# Patient Record
Sex: MALE | Race: BLACK OR AFRICAN AMERICAN | Employment: OTHER | URBAN - METROPOLITAN AREA
[De-identification: names, ages, dates, MRNs, and addresses within clinical notes are randomized per-mention and may not be internally consistent; named-entity substitution may affect disease eponyms.]

---

## 2024-09-11 ENCOUNTER — HOSPITAL ENCOUNTER (INPATIENT)
Age: 64
LOS: 2 days | Discharge: NURSING, REHAB OR LONG-TERM CARE | DRG: 673 | End: 2024-09-14
Attending: EMERGENCY MEDICINE | Admitting: INTERNAL MEDICINE
Payer: MEDICARE

## 2024-09-11 DIAGNOSIS — N49.3 FOURNIER'S GANGRENE: ICD-10-CM

## 2024-09-11 DIAGNOSIS — R31.0 GROSS HEMATURIA: Primary | ICD-10-CM

## 2024-09-11 PROCEDURE — 99285 EMERGENCY DEPT VISIT HI MDM: CPT

## 2024-09-12 ENCOUNTER — ANESTHESIA (OUTPATIENT)
Dept: OPERATING ROOM | Age: 64
End: 2024-09-12
Payer: MEDICARE

## 2024-09-12 ENCOUNTER — APPOINTMENT (OUTPATIENT)
Dept: CT IMAGING | Age: 64
DRG: 673 | End: 2024-09-12
Payer: MEDICARE

## 2024-09-12 ENCOUNTER — ANESTHESIA EVENT (OUTPATIENT)
Dept: OPERATING ROOM | Age: 64
End: 2024-09-12
Payer: MEDICARE

## 2024-09-12 PROBLEM — R31.9 HEMATURIA: Status: ACTIVE | Noted: 2024-09-12

## 2024-09-12 LAB
ABO + RH BLD: NORMAL
ANION GAP SERPL CALCULATED.3IONS-SCNC: 8 MMOL/L (ref 3–16)
BASOPHILS # BLD: 0 K/UL (ref 0–0.2)
BASOPHILS # BLD: 0 K/UL (ref 0–0.2)
BASOPHILS NFR BLD: 0.3 %
BASOPHILS NFR BLD: 0.4 %
BLD GP AB SCN SERPL QL: NORMAL
BUN SERPL-MCNC: 23 MG/DL (ref 7–20)
CALCIUM SERPL-MCNC: 9.5 MG/DL (ref 8.3–10.6)
CHLORIDE SERPL-SCNC: 105 MMOL/L (ref 99–110)
CO2 SERPL-SCNC: 27 MMOL/L (ref 21–32)
CREAT SERPL-MCNC: <0.5 MG/DL (ref 0.8–1.3)
DEPRECATED RDW RBC AUTO: 13.4 % (ref 12.4–15.4)
DEPRECATED RDW RBC AUTO: 13.7 % (ref 12.4–15.4)
EOSINOPHIL # BLD: 0 K/UL (ref 0–0.6)
EOSINOPHIL # BLD: 0.1 K/UL (ref 0–0.6)
EOSINOPHIL NFR BLD: 0.3 %
EOSINOPHIL NFR BLD: 1.4 %
GFR SERPLBLD CREATININE-BSD FMLA CKD-EPI: >90 ML/MIN/{1.73_M2}
GLUCOSE BLD-MCNC: 110 MG/DL (ref 70–99)
GLUCOSE SERPL-MCNC: 106 MG/DL (ref 70–99)
HCT VFR BLD AUTO: 34.7 % (ref 40.5–52.5)
HCT VFR BLD AUTO: 35.8 % (ref 40.5–52.5)
HGB BLD-MCNC: 11.6 G/DL (ref 13.5–17.5)
HGB BLD-MCNC: 12 G/DL (ref 13.5–17.5)
INR PPP: 1 (ref 0.85–1.15)
LYMPHOCYTES # BLD: 0.9 K/UL (ref 1–5.1)
LYMPHOCYTES # BLD: 2.1 K/UL (ref 1–5.1)
LYMPHOCYTES NFR BLD: 10.8 %
LYMPHOCYTES NFR BLD: 40.9 %
MCH RBC QN AUTO: 30.5 PG (ref 26–34)
MCH RBC QN AUTO: 30.6 PG (ref 26–34)
MCHC RBC AUTO-ENTMCNC: 33.4 G/DL (ref 31–36)
MCHC RBC AUTO-ENTMCNC: 33.5 G/DL (ref 31–36)
MCV RBC AUTO: 91.3 FL (ref 80–100)
MCV RBC AUTO: 91.4 FL (ref 80–100)
MONOCYTES # BLD: 0.4 K/UL (ref 0–1.3)
MONOCYTES # BLD: 0.6 K/UL (ref 0–1.3)
MONOCYTES NFR BLD: 6.5 %
MONOCYTES NFR BLD: 7.2 %
NEUTROPHILS # BLD: 2.5 K/UL (ref 1.7–7.7)
NEUTROPHILS # BLD: 7.1 K/UL (ref 1.7–7.7)
NEUTROPHILS NFR BLD: 50.1 %
NEUTROPHILS NFR BLD: 82.1 %
PERFORMED ON: ABNORMAL
PLATELET # BLD AUTO: 163 K/UL (ref 135–450)
PLATELET # BLD AUTO: 173 K/UL (ref 135–450)
PMV BLD AUTO: 8.3 FL (ref 5–10.5)
PMV BLD AUTO: 8.4 FL (ref 5–10.5)
POTASSIUM SERPL-SCNC: 3.8 MMOL/L (ref 3.5–5.1)
PROTHROMBIN TIME: 13.4 SEC (ref 11.9–14.9)
RBC # BLD AUTO: 3.8 M/UL (ref 4.2–5.9)
RBC # BLD AUTO: 3.92 M/UL (ref 4.2–5.9)
SODIUM SERPL-SCNC: 140 MMOL/L (ref 136–145)
WBC # BLD AUTO: 5 K/UL (ref 4–11)
WBC # BLD AUTO: 8.6 K/UL (ref 4–11)

## 2024-09-12 PROCEDURE — 2580000003 HC RX 258

## 2024-09-12 PROCEDURE — 6370000000 HC RX 637 (ALT 250 FOR IP): Performed by: UROLOGY

## 2024-09-12 PROCEDURE — 87075 CULTR BACTERIA EXCEPT BLOOD: CPT

## 2024-09-12 PROCEDURE — 86850 RBC ANTIBODY SCREEN: CPT

## 2024-09-12 PROCEDURE — 74178 CT ABD&PLV WO CNTR FLWD CNTR: CPT

## 2024-09-12 PROCEDURE — 86900 BLOOD TYPING SEROLOGIC ABO: CPT

## 2024-09-12 PROCEDURE — 3700000001 HC ADD 15 MINUTES (ANESTHESIA): Performed by: UROLOGY

## 2024-09-12 PROCEDURE — 6360000002 HC RX W HCPCS

## 2024-09-12 PROCEDURE — 0WJM0ZZ INSPECTION OF MALE PERINEUM, OPEN APPROACH: ICD-10-PCS | Performed by: UROLOGY

## 2024-09-12 PROCEDURE — 87076 CULTURE ANAEROBE IDENT EACH: CPT

## 2024-09-12 PROCEDURE — 0JBB0ZZ EXCISION OF PERINEUM SUBCUTANEOUS TISSUE AND FASCIA, OPEN APPROACH: ICD-10-PCS | Performed by: UROLOGY

## 2024-09-12 PROCEDURE — 0TJB8ZZ INSPECTION OF BLADDER, VIA NATURAL OR ARTIFICIAL OPENING ENDOSCOPIC: ICD-10-PCS | Performed by: UROLOGY

## 2024-09-12 PROCEDURE — 87102 FUNGUS ISOLATION CULTURE: CPT

## 2024-09-12 PROCEDURE — 2580000003 HC RX 258: Performed by: UROLOGY

## 2024-09-12 PROCEDURE — 87205 SMEAR GRAM STAIN: CPT

## 2024-09-12 PROCEDURE — 7100000001 HC PACU RECOVERY - ADDTL 15 MIN: Performed by: UROLOGY

## 2024-09-12 PROCEDURE — 36415 COLL VENOUS BLD VENIPUNCTURE: CPT

## 2024-09-12 PROCEDURE — 86901 BLOOD TYPING SEROLOGIC RH(D): CPT

## 2024-09-12 PROCEDURE — 85610 PROTHROMBIN TIME: CPT

## 2024-09-12 PROCEDURE — 6360000004 HC RX CONTRAST MEDICATION: Performed by: INTERNAL MEDICINE

## 2024-09-12 PROCEDURE — 2500000003 HC RX 250 WO HCPCS

## 2024-09-12 PROCEDURE — 3600000004 HC SURGERY LEVEL 4 BASE: Performed by: UROLOGY

## 2024-09-12 PROCEDURE — 7100000000 HC PACU RECOVERY - FIRST 15 MIN: Performed by: UROLOGY

## 2024-09-12 PROCEDURE — 80048 BASIC METABOLIC PNL TOTAL CA: CPT

## 2024-09-12 PROCEDURE — 3600000014 HC SURGERY LEVEL 4 ADDTL 15MIN: Performed by: UROLOGY

## 2024-09-12 PROCEDURE — 87077 CULTURE AEROBIC IDENTIFY: CPT

## 2024-09-12 PROCEDURE — A4217 STERILE WATER/SALINE, 500 ML: HCPCS | Performed by: UROLOGY

## 2024-09-12 PROCEDURE — 2709999900 HC NON-CHARGEABLE SUPPLY: Performed by: UROLOGY

## 2024-09-12 PROCEDURE — 0TCB8ZZ EXTIRPATION OF MATTER FROM BLADDER, VIA NATURAL OR ARTIFICIAL OPENING ENDOSCOPIC: ICD-10-PCS | Performed by: UROLOGY

## 2024-09-12 PROCEDURE — 87070 CULTURE OTHR SPECIMN AEROBIC: CPT

## 2024-09-12 PROCEDURE — C1769 GUIDE WIRE: HCPCS | Performed by: UROLOGY

## 2024-09-12 PROCEDURE — 1200000000 HC SEMI PRIVATE

## 2024-09-12 PROCEDURE — 6360000002 HC RX W HCPCS: Performed by: UROLOGY

## 2024-09-12 PROCEDURE — 85025 COMPLETE CBC W/AUTO DIFF WBC: CPT

## 2024-09-12 PROCEDURE — 6360000002 HC RX W HCPCS: Performed by: EMERGENCY MEDICINE

## 2024-09-12 PROCEDURE — 3700000000 HC ANESTHESIA ATTENDED CARE: Performed by: UROLOGY

## 2024-09-12 PROCEDURE — 72192 CT PELVIS W/O DYE: CPT

## 2024-09-12 RX ORDER — POTASSIUM CHLORIDE 7.45 MG/ML
10 INJECTION INTRAVENOUS PRN
Status: DISCONTINUED | OUTPATIENT
Start: 2024-09-12 | End: 2024-09-14 | Stop reason: HOSPADM

## 2024-09-12 RX ORDER — ACETAMINOPHEN 650 MG/1
650 SUPPOSITORY RECTAL EVERY 6 HOURS PRN
Status: DISCONTINUED | OUTPATIENT
Start: 2024-09-12 | End: 2024-09-14 | Stop reason: HOSPADM

## 2024-09-12 RX ORDER — ONDANSETRON 2 MG/ML
INJECTION INTRAMUSCULAR; INTRAVENOUS
Status: DISCONTINUED | OUTPATIENT
Start: 2024-09-12 | End: 2024-09-12 | Stop reason: SDUPTHER

## 2024-09-12 RX ORDER — CLONIDINE HYDROCHLORIDE 0.1 MG/1
0.1 TABLET ORAL 2 TIMES DAILY
Status: DISCONTINUED | OUTPATIENT
Start: 2024-09-12 | End: 2024-09-14 | Stop reason: HOSPADM

## 2024-09-12 RX ORDER — NALOXONE HYDROCHLORIDE 0.4 MG/ML
INJECTION, SOLUTION INTRAMUSCULAR; INTRAVENOUS; SUBCUTANEOUS PRN
Status: DISCONTINUED | OUTPATIENT
Start: 2024-09-12 | End: 2024-09-12 | Stop reason: HOSPADM

## 2024-09-12 RX ORDER — POLYETHYLENE GLYCOL 3350 17 G/17G
17 POWDER, FOR SOLUTION ORAL DAILY PRN
Status: DISCONTINUED | OUTPATIENT
Start: 2024-09-12 | End: 2024-09-14 | Stop reason: HOSPADM

## 2024-09-12 RX ORDER — SODIUM CHLORIDE 0.9 % (FLUSH) 0.9 %
5-40 SYRINGE (ML) INJECTION PRN
Status: DISCONTINUED | OUTPATIENT
Start: 2024-09-12 | End: 2024-09-14 | Stop reason: HOSPADM

## 2024-09-12 RX ORDER — ONDANSETRON 2 MG/ML
4 INJECTION INTRAMUSCULAR; INTRAVENOUS EVERY 6 HOURS PRN
Status: DISCONTINUED | OUTPATIENT
Start: 2024-09-12 | End: 2024-09-14 | Stop reason: HOSPADM

## 2024-09-12 RX ORDER — BACLOFEN 10 MG/1
5 TABLET ORAL 2 TIMES DAILY
Status: DISCONTINUED | OUTPATIENT
Start: 2024-09-12 | End: 2024-09-14 | Stop reason: HOSPADM

## 2024-09-12 RX ORDER — LOSARTAN POTASSIUM 50 MG/1
50 TABLET ORAL DAILY
Status: DISCONTINUED | OUTPATIENT
Start: 2024-09-12 | End: 2024-09-14 | Stop reason: HOSPADM

## 2024-09-12 RX ORDER — LIDOCAINE HYDROCHLORIDE 20 MG/ML
INJECTION, SOLUTION INTRAVENOUS
Status: DISCONTINUED | OUTPATIENT
Start: 2024-09-12 | End: 2024-09-12 | Stop reason: SDUPTHER

## 2024-09-12 RX ORDER — SODIUM CHLORIDE 0.9 % (FLUSH) 0.9 %
5-40 SYRINGE (ML) INJECTION PRN
Status: DISCONTINUED | OUTPATIENT
Start: 2024-09-12 | End: 2024-09-12 | Stop reason: HOSPADM

## 2024-09-12 RX ORDER — SODIUM CHLORIDE 9 MG/ML
INJECTION, SOLUTION INTRAVENOUS PRN
Status: DISCONTINUED | OUTPATIENT
Start: 2024-09-12 | End: 2024-09-14 | Stop reason: HOSPADM

## 2024-09-12 RX ORDER — MORPHINE SULFATE 2 MG/ML
2 INJECTION, SOLUTION INTRAMUSCULAR; INTRAVENOUS ONCE
Status: COMPLETED | OUTPATIENT
Start: 2024-09-12 | End: 2024-09-12

## 2024-09-12 RX ORDER — SODIUM CHLORIDE 0.9 % (FLUSH) 0.9 %
5-40 SYRINGE (ML) INJECTION EVERY 12 HOURS SCHEDULED
Status: DISCONTINUED | OUTPATIENT
Start: 2024-09-12 | End: 2024-09-14 | Stop reason: HOSPADM

## 2024-09-12 RX ORDER — FENTANYL CITRATE 50 UG/ML
INJECTION, SOLUTION INTRAMUSCULAR; INTRAVENOUS
Status: DISCONTINUED | OUTPATIENT
Start: 2024-09-12 | End: 2024-09-12 | Stop reason: SDUPTHER

## 2024-09-12 RX ORDER — HYDROMORPHONE HYDROCHLORIDE 1 MG/ML
0.5 INJECTION, SOLUTION INTRAMUSCULAR; INTRAVENOUS; SUBCUTANEOUS EVERY 5 MIN PRN
Status: DISCONTINUED | OUTPATIENT
Start: 2024-09-12 | End: 2024-09-12 | Stop reason: HOSPADM

## 2024-09-12 RX ORDER — POTASSIUM CHLORIDE 1500 MG/1
40 TABLET, EXTENDED RELEASE ORAL PRN
Status: DISCONTINUED | OUTPATIENT
Start: 2024-09-12 | End: 2024-09-14 | Stop reason: HOSPADM

## 2024-09-12 RX ORDER — MAGNESIUM SULFATE IN WATER 40 MG/ML
2000 INJECTION, SOLUTION INTRAVENOUS PRN
Status: DISCONTINUED | OUTPATIENT
Start: 2024-09-12 | End: 2024-09-14 | Stop reason: HOSPADM

## 2024-09-12 RX ORDER — SODIUM CHLORIDE 9 MG/ML
INJECTION, SOLUTION INTRAVENOUS PRN
Status: DISCONTINUED | OUTPATIENT
Start: 2024-09-12 | End: 2024-09-12 | Stop reason: HOSPADM

## 2024-09-12 RX ORDER — IOPAMIDOL 755 MG/ML
75 INJECTION, SOLUTION INTRAVASCULAR
Status: COMPLETED | OUTPATIENT
Start: 2024-09-12 | End: 2024-09-12

## 2024-09-12 RX ORDER — HYDRALAZINE HYDROCHLORIDE 20 MG/ML
10 INJECTION INTRAMUSCULAR; INTRAVENOUS
Status: DISCONTINUED | OUTPATIENT
Start: 2024-09-12 | End: 2024-09-12 | Stop reason: HOSPADM

## 2024-09-12 RX ORDER — MAGNESIUM HYDROXIDE 1200 MG/15ML
LIQUID ORAL CONTINUOUS PRN
Status: COMPLETED | OUTPATIENT
Start: 2024-09-12 | End: 2024-09-12

## 2024-09-12 RX ORDER — ONDANSETRON 4 MG/1
4 TABLET, ORALLY DISINTEGRATING ORAL EVERY 8 HOURS PRN
Status: DISCONTINUED | OUTPATIENT
Start: 2024-09-12 | End: 2024-09-14 | Stop reason: HOSPADM

## 2024-09-12 RX ORDER — HYDROMORPHONE HYDROCHLORIDE 2 MG/ML
INJECTION, SOLUTION INTRAMUSCULAR; INTRAVENOUS; SUBCUTANEOUS
Status: DISCONTINUED | OUTPATIENT
Start: 2024-09-12 | End: 2024-09-12 | Stop reason: SDUPTHER

## 2024-09-12 RX ORDER — BUPIVACAINE HYDROCHLORIDE 5 MG/ML
INJECTION, SOLUTION EPIDURAL; INTRACAUDAL PRN
Status: DISCONTINUED | OUTPATIENT
Start: 2024-09-12 | End: 2024-09-12 | Stop reason: ALTCHOICE

## 2024-09-12 RX ORDER — ACETAMINOPHEN 325 MG/1
650 TABLET ORAL EVERY 6 HOURS PRN
Status: DISCONTINUED | OUTPATIENT
Start: 2024-09-12 | End: 2024-09-14 | Stop reason: HOSPADM

## 2024-09-12 RX ORDER — PIPERACILLIN SODIUM, TAZOBACTAM SODIUM 3; .375 G/15ML; G/15ML
3375 INJECTION, POWDER, LYOPHILIZED, FOR SOLUTION INTRAVENOUS ONCE
Status: DISCONTINUED | OUTPATIENT
Start: 2024-09-12 | End: 2024-09-12

## 2024-09-12 RX ORDER — PROCHLORPERAZINE EDISYLATE 5 MG/ML
5 INJECTION INTRAMUSCULAR; INTRAVENOUS
Status: DISCONTINUED | OUTPATIENT
Start: 2024-09-12 | End: 2024-09-12 | Stop reason: HOSPADM

## 2024-09-12 RX ORDER — FENTANYL CITRATE 50 UG/ML
25 INJECTION, SOLUTION INTRAMUSCULAR; INTRAVENOUS EVERY 5 MIN PRN
Status: DISCONTINUED | OUTPATIENT
Start: 2024-09-12 | End: 2024-09-12 | Stop reason: HOSPADM

## 2024-09-12 RX ORDER — ROCURONIUM BROMIDE 10 MG/ML
INJECTION, SOLUTION INTRAVENOUS
Status: DISCONTINUED | OUTPATIENT
Start: 2024-09-12 | End: 2024-09-12 | Stop reason: SDUPTHER

## 2024-09-12 RX ORDER — ATORVASTATIN CALCIUM 10 MG/1
10 TABLET, FILM COATED ORAL DAILY
Status: DISCONTINUED | OUTPATIENT
Start: 2024-09-12 | End: 2024-09-14 | Stop reason: HOSPADM

## 2024-09-12 RX ORDER — PROPOFOL 10 MG/ML
INJECTION, EMULSION INTRAVENOUS
Status: DISCONTINUED | OUTPATIENT
Start: 2024-09-12 | End: 2024-09-12 | Stop reason: SDUPTHER

## 2024-09-12 RX ORDER — SODIUM CHLORIDE, SODIUM LACTATE, POTASSIUM CHLORIDE, CALCIUM CHLORIDE 600; 310; 30; 20 MG/100ML; MG/100ML; MG/100ML; MG/100ML
INJECTION, SOLUTION INTRAVENOUS
Status: DISCONTINUED | OUTPATIENT
Start: 2024-09-12 | End: 2024-09-12 | Stop reason: SDUPTHER

## 2024-09-12 RX ORDER — LABETALOL HYDROCHLORIDE 5 MG/ML
10 INJECTION, SOLUTION INTRAVENOUS
Status: DISCONTINUED | OUTPATIENT
Start: 2024-09-12 | End: 2024-09-12 | Stop reason: HOSPADM

## 2024-09-12 RX ORDER — VANCOMYCIN HYDROCHLORIDE 1 G/20ML
INJECTION, POWDER, LYOPHILIZED, FOR SOLUTION INTRAVENOUS
Status: DISCONTINUED | OUTPATIENT
Start: 2024-09-12 | End: 2024-09-12 | Stop reason: SDUPTHER

## 2024-09-12 RX ORDER — AMLODIPINE BESYLATE 5 MG/1
5 TABLET ORAL DAILY
Status: DISCONTINUED | OUTPATIENT
Start: 2024-09-12 | End: 2024-09-14 | Stop reason: HOSPADM

## 2024-09-12 RX ORDER — SODIUM CHLORIDE 0.9 % (FLUSH) 0.9 %
5-40 SYRINGE (ML) INJECTION EVERY 12 HOURS SCHEDULED
Status: DISCONTINUED | OUTPATIENT
Start: 2024-09-12 | End: 2024-09-12 | Stop reason: HOSPADM

## 2024-09-12 RX ORDER — SENNOSIDES A AND B 8.6 MG/1
2 TABLET, FILM COATED ORAL DAILY
Status: DISCONTINUED | OUTPATIENT
Start: 2024-09-12 | End: 2024-09-14 | Stop reason: HOSPADM

## 2024-09-12 RX ORDER — ONDANSETRON 2 MG/ML
4 INJECTION INTRAMUSCULAR; INTRAVENOUS
Status: DISCONTINUED | OUTPATIENT
Start: 2024-09-12 | End: 2024-09-12 | Stop reason: HOSPADM

## 2024-09-12 RX ADMIN — CLONIDINE HYDROCHLORIDE 0.1 MG: 0.1 TABLET ORAL at 20:41

## 2024-09-12 RX ADMIN — PIPERACILLIN AND TAZOBACTAM 3375 MG: 3; .375 INJECTION, POWDER, LYOPHILIZED, FOR SOLUTION INTRAVENOUS at 10:19

## 2024-09-12 RX ADMIN — PROPOFOL 100 MG: 10 INJECTION, EMULSION INTRAVENOUS at 09:11

## 2024-09-12 RX ADMIN — ACETAMINOPHEN 650 MG: 325 TABLET ORAL at 14:20

## 2024-09-12 RX ADMIN — SODIUM CHLORIDE, PRESERVATIVE FREE 10 ML: 5 INJECTION INTRAVENOUS at 20:41

## 2024-09-12 RX ADMIN — SENNOSIDES 17.2 MG: 8.6 TABLET, FILM COATED ORAL at 14:35

## 2024-09-12 RX ADMIN — BACLOFEN 5 MG: 10 TABLET ORAL at 20:41

## 2024-09-12 RX ADMIN — FENTANYL CITRATE 50 MCG: 50 INJECTION, SOLUTION INTRAMUSCULAR; INTRAVENOUS at 09:03

## 2024-09-12 RX ADMIN — LIDOCAINE HYDROCHLORIDE 40 MG: 20 INJECTION, SOLUTION INTRAVENOUS at 10:39

## 2024-09-12 RX ADMIN — MORPHINE SULFATE 2 MG: 2 INJECTION, SOLUTION INTRAMUSCULAR; INTRAVENOUS at 04:14

## 2024-09-12 RX ADMIN — HYDROMORPHONE HYDROCHLORIDE 0.4 MG: 2 INJECTION, SOLUTION INTRAMUSCULAR; INTRAVENOUS; SUBCUTANEOUS at 10:47

## 2024-09-12 RX ADMIN — LOSARTAN POTASSIUM 50 MG: 50 TABLET, FILM COATED ORAL at 14:35

## 2024-09-12 RX ADMIN — VANCOMYCIN HYDROCHLORIDE 1000 MG: 1 INJECTION, POWDER, LYOPHILIZED, FOR SOLUTION INTRAVENOUS at 09:42

## 2024-09-12 RX ADMIN — FENTANYL CITRATE 50 MCG: 50 INJECTION, SOLUTION INTRAMUSCULAR; INTRAVENOUS at 09:11

## 2024-09-12 RX ADMIN — LIDOCAINE HYDROCHLORIDE 60 MG: 20 INJECTION, SOLUTION INTRAVENOUS at 09:11

## 2024-09-12 RX ADMIN — ATORVASTATIN CALCIUM 10 MG: 10 TABLET, FILM COATED ORAL at 14:35

## 2024-09-12 RX ADMIN — ROCURONIUM BROMIDE 60 MG: 10 INJECTION, SOLUTION INTRAVENOUS at 09:12

## 2024-09-12 RX ADMIN — PHENYLEPHRINE HYDROCHLORIDE 100 MCG: 10 INJECTION INTRAVENOUS at 09:15

## 2024-09-12 RX ADMIN — PROPOFOL 40 MG: 10 INJECTION, EMULSION INTRAVENOUS at 10:39

## 2024-09-12 RX ADMIN — ONDANSETRON 4 MG: 2 INJECTION INTRAMUSCULAR; INTRAVENOUS at 09:11

## 2024-09-12 RX ADMIN — IOPAMIDOL 75 ML: 755 INJECTION, SOLUTION INTRAVENOUS at 04:29

## 2024-09-12 RX ADMIN — CLONIDINE HYDROCHLORIDE 0.1 MG: 0.1 TABLET ORAL at 14:36

## 2024-09-12 RX ADMIN — BACLOFEN 5 MG: 10 TABLET ORAL at 14:36

## 2024-09-12 RX ADMIN — HYDROMORPHONE HYDROCHLORIDE 0.6 MG: 2 INJECTION, SOLUTION INTRAMUSCULAR; INTRAVENOUS; SUBCUTANEOUS at 11:00

## 2024-09-12 RX ADMIN — AMLODIPINE BESYLATE 5 MG: 5 TABLET ORAL at 14:35

## 2024-09-12 RX ADMIN — FENTANYL CITRATE 50 MCG: 50 INJECTION, SOLUTION INTRAMUSCULAR; INTRAVENOUS at 10:22

## 2024-09-12 RX ADMIN — FENTANYL CITRATE 50 MCG: 50 INJECTION, SOLUTION INTRAMUSCULAR; INTRAVENOUS at 10:42

## 2024-09-12 RX ADMIN — SODIUM CHLORIDE, SODIUM LACTATE, POTASSIUM CHLORIDE, AND CALCIUM CHLORIDE: .6; .31; .03; .02 INJECTION, SOLUTION INTRAVENOUS at 09:03

## 2024-09-12 RX ADMIN — SUGAMMADEX 200 MG: 100 INJECTION, SOLUTION INTRAVENOUS at 10:37

## 2024-09-12 ASSESSMENT — PAIN SCALES - GENERAL
PAINLEVEL_OUTOF10: 3
PAINLEVEL_OUTOF10: 0
PAINLEVEL_OUTOF10: 5

## 2024-09-12 ASSESSMENT — PAIN DESCRIPTION - DESCRIPTORS: DESCRIPTORS: OTHER (COMMENT)

## 2024-09-12 ASSESSMENT — PAIN DESCRIPTION - LOCATION
LOCATION: OTHER (COMMENT)
LOCATION: ABDOMEN

## 2024-09-12 ASSESSMENT — PAIN DESCRIPTION - ORIENTATION: ORIENTATION: OTHER (COMMENT)

## 2024-09-12 ASSESSMENT — PAIN - FUNCTIONAL ASSESSMENT
PAIN_FUNCTIONAL_ASSESSMENT: NONE - DENIES PAIN
PAIN_FUNCTIONAL_ASSESSMENT: 0-10
PAIN_FUNCTIONAL_ASSESSMENT: ACTIVITIES ARE NOT PREVENTED

## 2024-09-13 LAB
BASOPHILS # BLD: 0 K/UL (ref 0–0.2)
BASOPHILS NFR BLD: 0.2 %
DEPRECATED RDW RBC AUTO: 13.6 % (ref 12.4–15.4)
EOSINOPHIL # BLD: 0.1 K/UL (ref 0–0.6)
EOSINOPHIL NFR BLD: 0.9 %
HCT VFR BLD AUTO: 38.3 % (ref 40.5–52.5)
HGB BLD-MCNC: 12.2 G/DL (ref 13.5–17.5)
LOEFFLER MB STN SPEC: NORMAL
LYMPHOCYTES # BLD: 1.7 K/UL (ref 1–5.1)
LYMPHOCYTES NFR BLD: 22.7 %
MCH RBC QN AUTO: 29.3 PG (ref 26–34)
MCHC RBC AUTO-ENTMCNC: 31.9 G/DL (ref 31–36)
MCV RBC AUTO: 92 FL (ref 80–100)
MONOCYTES # BLD: 0.9 K/UL (ref 0–1.3)
MONOCYTES NFR BLD: 11.9 %
NEUTROPHILS # BLD: 4.9 K/UL (ref 1.7–7.7)
NEUTROPHILS NFR BLD: 64.3 %
PLATELET # BLD AUTO: 94 K/UL (ref 135–450)
PMV BLD AUTO: 8.7 FL (ref 5–10.5)
RBC # BLD AUTO: 4.17 M/UL (ref 4.2–5.9)
WBC # BLD AUTO: 7.6 K/UL (ref 4–11)

## 2024-09-13 PROCEDURE — 2580000003 HC RX 258: Performed by: SURGERY

## 2024-09-13 PROCEDURE — 36415 COLL VENOUS BLD VENIPUNCTURE: CPT

## 2024-09-13 PROCEDURE — 2580000003 HC RX 258: Performed by: UROLOGY

## 2024-09-13 PROCEDURE — 85025 COMPLETE CBC W/AUTO DIFF WBC: CPT

## 2024-09-13 PROCEDURE — 2580000003 HC RX 258: Performed by: NURSE PRACTITIONER

## 2024-09-13 PROCEDURE — 1200000000 HC SEMI PRIVATE

## 2024-09-13 PROCEDURE — 6370000000 HC RX 637 (ALT 250 FOR IP): Performed by: UROLOGY

## 2024-09-13 PROCEDURE — 6360000002 HC RX W HCPCS: Performed by: SURGERY

## 2024-09-13 RX ORDER — 0.9 % SODIUM CHLORIDE 0.9 %
500 INTRAVENOUS SOLUTION INTRAVENOUS ONCE
Status: COMPLETED | OUTPATIENT
Start: 2024-09-13 | End: 2024-09-13

## 2024-09-13 RX ADMIN — AMLODIPINE BESYLATE 5 MG: 5 TABLET ORAL at 07:53

## 2024-09-13 RX ADMIN — LOSARTAN POTASSIUM 50 MG: 50 TABLET, FILM COATED ORAL at 07:53

## 2024-09-13 RX ADMIN — SODIUM CHLORIDE 500 ML: 9 INJECTION, SOLUTION INTRAVENOUS at 00:37

## 2024-09-13 RX ADMIN — CLONIDINE HYDROCHLORIDE 0.1 MG: 0.1 TABLET ORAL at 07:53

## 2024-09-13 RX ADMIN — CLONIDINE HYDROCHLORIDE 0.1 MG: 0.1 TABLET ORAL at 20:55

## 2024-09-13 RX ADMIN — SODIUM CHLORIDE, PRESERVATIVE FREE 10 ML: 5 INJECTION INTRAVENOUS at 20:55

## 2024-09-13 RX ADMIN — SENNOSIDES 17.2 MG: 8.6 TABLET, FILM COATED ORAL at 07:52

## 2024-09-13 RX ADMIN — BACLOFEN 5 MG: 10 TABLET ORAL at 20:55

## 2024-09-13 RX ADMIN — PIPERACILLIN AND TAZOBACTAM 3375 MG: 3; .375 INJECTION, POWDER, LYOPHILIZED, FOR SOLUTION INTRAVENOUS at 21:55

## 2024-09-13 RX ADMIN — BACLOFEN 5 MG: 10 TABLET ORAL at 07:53

## 2024-09-13 RX ADMIN — SODIUM CHLORIDE, PRESERVATIVE FREE 10 ML: 5 INJECTION INTRAVENOUS at 07:53

## 2024-09-13 RX ADMIN — ATORVASTATIN CALCIUM 10 MG: 10 TABLET, FILM COATED ORAL at 07:53

## 2024-09-13 RX ADMIN — PIPERACILLIN AND TAZOBACTAM 3375 MG: 3; .375 INJECTION, POWDER, LYOPHILIZED, FOR SOLUTION INTRAVENOUS at 14:44

## 2024-09-14 VITALS
HEIGHT: 64 IN | SYSTOLIC BLOOD PRESSURE: 130 MMHG | HEART RATE: 68 BPM | DIASTOLIC BLOOD PRESSURE: 84 MMHG | TEMPERATURE: 98.2 F | RESPIRATION RATE: 18 BRPM | OXYGEN SATURATION: 100 % | BODY MASS INDEX: 25.66 KG/M2 | WEIGHT: 150.3 LBS

## 2024-09-14 LAB
BASOPHILS # BLD: 0 K/UL (ref 0–0.2)
BASOPHILS NFR BLD: 0.3 %
DEPRECATED RDW RBC AUTO: 13.8 % (ref 12.4–15.4)
EOSINOPHIL # BLD: 0.1 K/UL (ref 0–0.6)
EOSINOPHIL NFR BLD: 2.4 %
HCT VFR BLD AUTO: 28.8 % (ref 40.5–52.5)
HGB BLD-MCNC: 9.4 G/DL (ref 13.5–17.5)
LYMPHOCYTES # BLD: 1.9 K/UL (ref 1–5.1)
LYMPHOCYTES NFR BLD: 32.9 %
MCH RBC QN AUTO: 30.5 PG (ref 26–34)
MCHC RBC AUTO-ENTMCNC: 32.6 G/DL (ref 31–36)
MCV RBC AUTO: 93.3 FL (ref 80–100)
MONOCYTES # BLD: 0.5 K/UL (ref 0–1.3)
MONOCYTES NFR BLD: 8.5 %
NEUTROPHILS # BLD: 3.2 K/UL (ref 1.7–7.7)
NEUTROPHILS NFR BLD: 55.9 %
PLATELET # BLD AUTO: 124 K/UL (ref 135–450)
PMV BLD AUTO: 8.3 FL (ref 5–10.5)
RBC # BLD AUTO: 3.09 M/UL (ref 4.2–5.9)
WBC # BLD AUTO: 5.7 K/UL (ref 4–11)

## 2024-09-14 PROCEDURE — 85025 COMPLETE CBC W/AUTO DIFF WBC: CPT

## 2024-09-14 PROCEDURE — 36415 COLL VENOUS BLD VENIPUNCTURE: CPT

## 2024-09-14 PROCEDURE — 6360000002 HC RX W HCPCS: Performed by: SURGERY

## 2024-09-14 PROCEDURE — 2580000003 HC RX 258: Performed by: SURGERY

## 2024-09-14 PROCEDURE — 6370000000 HC RX 637 (ALT 250 FOR IP): Performed by: UROLOGY

## 2024-09-14 PROCEDURE — 2580000003 HC RX 258: Performed by: UROLOGY

## 2024-09-14 RX ADMIN — BACLOFEN 5 MG: 10 TABLET ORAL at 09:47

## 2024-09-14 RX ADMIN — ATORVASTATIN CALCIUM 10 MG: 10 TABLET, FILM COATED ORAL at 09:48

## 2024-09-14 RX ADMIN — CLONIDINE HYDROCHLORIDE 0.1 MG: 0.1 TABLET ORAL at 09:48

## 2024-09-14 RX ADMIN — SODIUM CHLORIDE, PRESERVATIVE FREE 10 ML: 5 INJECTION INTRAVENOUS at 09:48

## 2024-09-14 RX ADMIN — SENNOSIDES 17.2 MG: 8.6 TABLET, FILM COATED ORAL at 09:48

## 2024-09-14 RX ADMIN — PIPERACILLIN AND TAZOBACTAM 3375 MG: 3; .375 INJECTION, POWDER, LYOPHILIZED, FOR SOLUTION INTRAVENOUS at 06:27

## 2024-09-15 LAB
BACTERIA SPEC AEROBE CULT: NORMAL
BACTERIA SPEC ANAEROBE CULT: NORMAL
GRAM STN SPEC: NORMAL

## 2024-09-17 LAB
BACTERIA SPEC AEROBE CULT: ABNORMAL
BACTERIA SPEC AEROBE CULT: ABNORMAL
BACTERIA SPEC ANAEROBE CULT: ABNORMAL
GRAM STN SPEC: ABNORMAL
ORGANISM: ABNORMAL
ORGANISM: ABNORMAL

## 2024-09-23 LAB
FUNGUS SPEC CULT: NORMAL
LOEFFLER MB STN SPEC: NORMAL

## 2024-09-30 LAB
FUNGUS SPEC CULT: NORMAL
LOEFFLER MB STN SPEC: NORMAL

## 2024-10-07 LAB
FUNGUS SPEC CULT: NORMAL
LOEFFLER MB STN SPEC: NORMAL

## 2024-11-10 ENCOUNTER — APPOINTMENT (OUTPATIENT)
Dept: GENERAL RADIOLOGY | Age: 64
DRG: 871 | End: 2024-11-10
Payer: MEDICARE

## 2024-11-10 ENCOUNTER — HOSPITAL ENCOUNTER (INPATIENT)
Age: 64
LOS: 4 days | Discharge: HOSPICE | DRG: 871 | End: 2024-11-14
Attending: EMERGENCY MEDICINE | Admitting: INTERNAL MEDICINE
Payer: MEDICARE

## 2024-11-10 ENCOUNTER — APPOINTMENT (OUTPATIENT)
Dept: CT IMAGING | Age: 64
DRG: 871 | End: 2024-11-10
Payer: MEDICARE

## 2024-11-10 DIAGNOSIS — N17.9 ACUTE RENAL FAILURE, UNSPECIFIED ACUTE RENAL FAILURE TYPE (HCC): ICD-10-CM

## 2024-11-10 DIAGNOSIS — R33.8 ACUTE URINARY RETENTION: Primary | ICD-10-CM

## 2024-11-10 DIAGNOSIS — N39.0 ACUTE UTI: ICD-10-CM

## 2024-11-10 DIAGNOSIS — A41.9 SEPTICEMIA (HCC): ICD-10-CM

## 2024-11-10 PROBLEM — R65.21 SEPTIC SHOCK (HCC): Status: ACTIVE | Noted: 2024-11-10

## 2024-11-10 LAB
ALBUMIN SERPL-MCNC: 3.5 G/DL (ref 3.4–5)
ALP SERPL-CCNC: 87 U/L (ref 40–129)
ALT SERPL-CCNC: ABNORMAL U/L (ref 10–40)
ANION GAP SERPL CALCULATED.3IONS-SCNC: 18 MMOL/L (ref 3–16)
AST SERPL-CCNC: 55 U/L (ref 15–37)
BACTERIA URNS QL MICRO: ABNORMAL /HPF
BASE EXCESS BLDV CALC-SCNC: -5.6 MMOL/L (ref -2–3)
BASOPHILS # BLD: 0 K/UL (ref 0–0.2)
BASOPHILS NFR BLD: 0 %
BILIRUB DIRECT SERPL-MCNC: <0.1 MG/DL (ref 0–0.3)
BILIRUB INDIRECT SERPL-MCNC: 0.6 MG/DL (ref 0–1)
BILIRUB SERPL-MCNC: 0.7 MG/DL (ref 0–1)
BILIRUB UR QL STRIP.AUTO: NEGATIVE
BUN SERPL-MCNC: 39 MG/DL (ref 7–20)
CALCIUM SERPL-MCNC: 9.8 MG/DL (ref 8.3–10.6)
CHLORIDE SERPL-SCNC: 103 MMOL/L (ref 99–110)
CLARITY UR: CLEAR
CO2 BLDV-SCNC: 24 MMOL/L
CO2 SERPL-SCNC: 15 MMOL/L (ref 21–32)
COHGB MFR BLDV: 1.1 % (ref 0–1.5)
COLOR UR: YELLOW
CREAT SERPL-MCNC: 2.5 MG/DL (ref 0.8–1.3)
DEPRECATED RDW RBC AUTO: 15.2 % (ref 12.4–15.4)
DO-HGB MFR BLDV: 49.4 %
EOSINOPHIL # BLD: 0 K/UL (ref 0–0.6)
EOSINOPHIL NFR BLD: 0 %
EPI CELLS #/AREA URNS HPF: ABNORMAL /HPF (ref 0–5)
GFR SERPLBLD CREATININE-BSD FMLA CKD-EPI: 28 ML/MIN/{1.73_M2}
GLUCOSE BLD-MCNC: 100 MG/DL (ref 70–99)
GLUCOSE BLD-MCNC: 115 MG/DL (ref 70–99)
GLUCOSE BLD-MCNC: 99 MG/DL (ref 70–99)
GLUCOSE SERPL-MCNC: 143 MG/DL (ref 70–99)
GLUCOSE UR STRIP.AUTO-MCNC: NEGATIVE MG/DL
HCO3 BLDV-SCNC: 22.1 MMOL/L (ref 24–28)
HCT VFR BLD AUTO: 47 % (ref 40.5–52.5)
HGB BLD-MCNC: 14.7 G/DL (ref 13.5–17.5)
HGB UR QL STRIP.AUTO: ABNORMAL
KETONES UR STRIP.AUTO-MCNC: NEGATIVE MG/DL
LACTATE BLDV-SCNC: 1.3 MMOL/L (ref 0.4–2)
LACTATE BLDV-SCNC: 5.2 MMOL/L (ref 0.4–2)
LEUKOCYTE ESTERASE UR QL STRIP.AUTO: ABNORMAL
LIPASE SERPL-CCNC: 21 U/L (ref 13–60)
LYMPHOCYTES # BLD: 0.5 K/UL (ref 1–5.1)
LYMPHOCYTES NFR BLD: 3.2 %
MAGNESIUM SERPL-MCNC: 1.93 MG/DL (ref 1.8–2.4)
MCH RBC QN AUTO: 29.4 PG (ref 26–34)
MCHC RBC AUTO-ENTMCNC: 31.3 G/DL (ref 31–36)
MCV RBC AUTO: 94 FL (ref 80–100)
METHGB MFR BLDV: 0.1 % (ref 0–1.5)
MONOCYTES # BLD: 0.3 K/UL (ref 0–1.3)
MONOCYTES NFR BLD: 2.2 %
NEUTROPHILS # BLD: 14 K/UL (ref 1.7–7.7)
NEUTROPHILS NFR BLD: 94.6 %
NITRITE UR QL STRIP.AUTO: NEGATIVE
NT-PROBNP SERPL-MCNC: 4671 PG/ML (ref 0–124)
PCO2 BLDV: 50.4 MMHG (ref 41–51)
PERFORMED ON: ABNORMAL
PERFORMED ON: ABNORMAL
PERFORMED ON: NORMAL
PH BLDV: 7.25 [PH] (ref 7.35–7.45)
PH UR STRIP.AUTO: 8.5 [PH] (ref 5–8)
PLATELET # BLD AUTO: 129 K/UL (ref 135–450)
PMV BLD AUTO: 9.8 FL (ref 5–10.5)
PO2 BLDV: 31.8 MMHG (ref 25–40)
POTASSIUM SERPL-SCNC: 4.1 MMOL/L (ref 3.5–5.1)
POTASSIUM SERPL-SCNC: ABNORMAL MMOL/L (ref 3.5–5.1)
PROT SERPL-MCNC: 8.3 G/DL (ref 6.4–8.2)
PROT UR STRIP.AUTO-MCNC: >=300 MG/DL
RBC # BLD AUTO: 5 M/UL (ref 4.2–5.9)
RBC #/AREA URNS HPF: ABNORMAL /HPF (ref 0–4)
SAO2 % BLDV: 50 %
SODIUM SERPL-SCNC: 136 MMOL/L (ref 136–145)
SP GR UR STRIP.AUTO: 1.02 (ref 1–1.03)
TROPONIN, HIGH SENSITIVITY: 40 NG/L (ref 0–22)
TROPONIN, HIGH SENSITIVITY: 41 NG/L (ref 0–22)
UA DIPSTICK W REFLEX MICRO PNL UR: YES
URATE SERPL-MCNC: 5.4 MG/DL (ref 3.5–7.2)
URN SPEC COLLECT METH UR: ABNORMAL
UROBILINOGEN UR STRIP-ACNC: 0.2 E.U./DL
WBC # BLD AUTO: 14.8 K/UL (ref 4–11)
WBC #/AREA URNS HPF: ABNORMAL /HPF (ref 0–5)

## 2024-11-10 PROCEDURE — 51702 INSERT TEMP BLADDER CATH: CPT

## 2024-11-10 PROCEDURE — 74176 CT ABD & PELVIS W/O CONTRAST: CPT

## 2024-11-10 PROCEDURE — 93005 ELECTROCARDIOGRAM TRACING: CPT | Performed by: EMERGENCY MEDICINE

## 2024-11-10 PROCEDURE — 36410 VNPNXR 3YR/> PHY/QHP DX/THER: CPT

## 2024-11-10 PROCEDURE — 2580000003 HC RX 258: Performed by: INTERNAL MEDICINE

## 2024-11-10 PROCEDURE — 80048 BASIC METABOLIC PNL TOTAL CA: CPT

## 2024-11-10 PROCEDURE — 2060000000 HC ICU INTERMEDIATE R&B

## 2024-11-10 PROCEDURE — 87150 DNA/RNA AMPLIFIED PROBE: CPT

## 2024-11-10 PROCEDURE — 6360000002 HC RX W HCPCS: Performed by: EMERGENCY MEDICINE

## 2024-11-10 PROCEDURE — 83880 ASSAY OF NATRIURETIC PEPTIDE: CPT

## 2024-11-10 PROCEDURE — 96365 THER/PROPH/DIAG IV INF INIT: CPT

## 2024-11-10 PROCEDURE — 36415 COLL VENOUS BLD VENIPUNCTURE: CPT

## 2024-11-10 PROCEDURE — 84484 ASSAY OF TROPONIN QUANT: CPT

## 2024-11-10 PROCEDURE — 87077 CULTURE AEROBIC IDENTIFY: CPT

## 2024-11-10 PROCEDURE — 87086 URINE CULTURE/COLONY COUNT: CPT

## 2024-11-10 PROCEDURE — 83690 ASSAY OF LIPASE: CPT

## 2024-11-10 PROCEDURE — 6360000002 HC RX W HCPCS: Performed by: INTERNAL MEDICINE

## 2024-11-10 PROCEDURE — 84132 ASSAY OF SERUM POTASSIUM: CPT

## 2024-11-10 PROCEDURE — 87186 SC STD MICRODIL/AGAR DIL: CPT

## 2024-11-10 PROCEDURE — 85025 COMPLETE CBC W/AUTO DIFF WBC: CPT

## 2024-11-10 PROCEDURE — 87040 BLOOD CULTURE FOR BACTERIA: CPT

## 2024-11-10 PROCEDURE — 83735 ASSAY OF MAGNESIUM: CPT

## 2024-11-10 PROCEDURE — 83605 ASSAY OF LACTIC ACID: CPT

## 2024-11-10 PROCEDURE — 82803 BLOOD GASES ANY COMBINATION: CPT

## 2024-11-10 PROCEDURE — 84550 ASSAY OF BLOOD/URIC ACID: CPT

## 2024-11-10 PROCEDURE — 81001 URINALYSIS AUTO W/SCOPE: CPT

## 2024-11-10 PROCEDURE — 71045 X-RAY EXAM CHEST 1 VIEW: CPT

## 2024-11-10 PROCEDURE — 96366 THER/PROPH/DIAG IV INF ADDON: CPT

## 2024-11-10 PROCEDURE — 99285 EMERGENCY DEPT VISIT HI MDM: CPT

## 2024-11-10 PROCEDURE — 80076 HEPATIC FUNCTION PANEL: CPT

## 2024-11-10 PROCEDURE — 2580000003 HC RX 258: Performed by: EMERGENCY MEDICINE

## 2024-11-10 RX ORDER — SODIUM CHLORIDE 9 MG/ML
INJECTION, SOLUTION INTRAVENOUS PRN
Status: DISCONTINUED | OUTPATIENT
Start: 2024-11-10 | End: 2024-11-13

## 2024-11-10 RX ORDER — SODIUM CHLORIDE 0.9 % (FLUSH) 0.9 %
5-40 SYRINGE (ML) INJECTION PRN
Status: DISCONTINUED | OUTPATIENT
Start: 2024-11-10 | End: 2024-11-13

## 2024-11-10 RX ORDER — SODIUM CHLORIDE 9 MG/ML
INJECTION, SOLUTION INTRAVENOUS CONTINUOUS
Status: DISCONTINUED | OUTPATIENT
Start: 2024-11-10 | End: 2024-11-11

## 2024-11-10 RX ORDER — POTASSIUM CHLORIDE 7.45 MG/ML
10 INJECTION INTRAVENOUS PRN
Status: DISCONTINUED | OUTPATIENT
Start: 2024-11-10 | End: 2024-11-10

## 2024-11-10 RX ORDER — 0.9 % SODIUM CHLORIDE 0.9 %
30 INTRAVENOUS SOLUTION INTRAVENOUS ONCE
Status: DISCONTINUED | OUTPATIENT
Start: 2024-11-10 | End: 2024-11-10 | Stop reason: SDUPTHER

## 2024-11-10 RX ORDER — LOSARTAN POTASSIUM 50 MG/1
50 TABLET ORAL DAILY
Status: DISCONTINUED | OUTPATIENT
Start: 2024-11-10 | End: 2024-11-13

## 2024-11-10 RX ORDER — CLONIDINE HYDROCHLORIDE 0.1 MG/1
0.05 TABLET ORAL 2 TIMES DAILY
Status: DISCONTINUED | OUTPATIENT
Start: 2024-11-10 | End: 2024-11-12

## 2024-11-10 RX ORDER — NOREPINEPHRINE BITARTRATE 0.06 MG/ML
1-100 INJECTION, SOLUTION INTRAVENOUS CONTINUOUS
Status: DISCONTINUED | OUTPATIENT
Start: 2024-11-10 | End: 2024-11-10

## 2024-11-10 RX ORDER — ONDANSETRON 4 MG/1
4 TABLET, ORALLY DISINTEGRATING ORAL EVERY 8 HOURS PRN
Status: DISCONTINUED | OUTPATIENT
Start: 2024-11-10 | End: 2024-11-13

## 2024-11-10 RX ORDER — VANCOMYCIN 1.5 G/300ML
1500 INJECTION, SOLUTION INTRAVENOUS ONCE
Status: COMPLETED | OUTPATIENT
Start: 2024-11-10 | End: 2024-11-10

## 2024-11-10 RX ORDER — 0.9 % SODIUM CHLORIDE 0.9 %
30 INTRAVENOUS SOLUTION INTRAVENOUS ONCE
Status: COMPLETED | OUTPATIENT
Start: 2024-11-10 | End: 2024-11-10

## 2024-11-10 RX ORDER — BISACODYL 5 MG/1
10 TABLET, DELAYED RELEASE ORAL DAILY PRN
Status: DISCONTINUED | OUTPATIENT
Start: 2024-11-10 | End: 2024-11-13

## 2024-11-10 RX ORDER — ACETAMINOPHEN 650 MG/1
650 SUPPOSITORY RECTAL EVERY 6 HOURS PRN
Status: DISCONTINUED | OUTPATIENT
Start: 2024-11-10 | End: 2024-11-13

## 2024-11-10 RX ORDER — SODIUM CHLORIDE 0.9 % (FLUSH) 0.9 %
5-40 SYRINGE (ML) INJECTION EVERY 12 HOURS SCHEDULED
Status: DISCONTINUED | OUTPATIENT
Start: 2024-11-10 | End: 2024-11-13

## 2024-11-10 RX ORDER — ACETAMINOPHEN 325 MG/1
650 TABLET ORAL EVERY 6 HOURS PRN
Status: DISCONTINUED | OUTPATIENT
Start: 2024-11-10 | End: 2024-11-13

## 2024-11-10 RX ORDER — POTASSIUM CHLORIDE 1500 MG/1
40 TABLET, EXTENDED RELEASE ORAL PRN
Status: DISCONTINUED | OUTPATIENT
Start: 2024-11-10 | End: 2024-11-10

## 2024-11-10 RX ORDER — POLYETHYLENE GLYCOL 3350 17 G/17G
17 POWDER, FOR SOLUTION ORAL DAILY PRN
Status: DISCONTINUED | OUTPATIENT
Start: 2024-11-10 | End: 2024-11-13

## 2024-11-10 RX ORDER — BACLOFEN 5 MG/1
5 TABLET ORAL 2 TIMES DAILY
Status: DISCONTINUED | OUTPATIENT
Start: 2024-11-10 | End: 2024-11-13

## 2024-11-10 RX ORDER — AMLODIPINE BESYLATE 5 MG/1
5 TABLET ORAL DAILY
Status: DISCONTINUED | OUTPATIENT
Start: 2024-11-10 | End: 2024-11-12

## 2024-11-10 RX ORDER — ATORVASTATIN CALCIUM 20 MG/1
10 TABLET, FILM COATED ORAL DAILY
Status: DISCONTINUED | OUTPATIENT
Start: 2024-11-10 | End: 2024-11-13

## 2024-11-10 RX ORDER — INSULIN LISPRO 100 [IU]/ML
0-4 INJECTION, SOLUTION INTRAVENOUS; SUBCUTANEOUS
Status: DISCONTINUED | OUTPATIENT
Start: 2024-11-10 | End: 2024-11-13

## 2024-11-10 RX ORDER — MAGNESIUM SULFATE IN WATER 40 MG/ML
2000 INJECTION, SOLUTION INTRAVENOUS PRN
Status: DISCONTINUED | OUTPATIENT
Start: 2024-11-10 | End: 2024-11-10

## 2024-11-10 RX ORDER — VANCOMYCIN 1 G/200ML
15 INJECTION, SOLUTION INTRAVENOUS EVERY 12 HOURS
Status: DISCONTINUED | OUTPATIENT
Start: 2024-11-10 | End: 2024-11-10

## 2024-11-10 RX ORDER — ONDANSETRON 2 MG/ML
4 INJECTION INTRAMUSCULAR; INTRAVENOUS EVERY 6 HOURS PRN
Status: DISCONTINUED | OUTPATIENT
Start: 2024-11-10 | End: 2024-11-13

## 2024-11-10 RX ORDER — ASPIRIN 81 MG/1
81 TABLET ORAL DAILY
Status: DISCONTINUED | OUTPATIENT
Start: 2024-11-10 | End: 2024-11-13

## 2024-11-10 RX ORDER — ENOXAPARIN SODIUM 100 MG/ML
30 INJECTION SUBCUTANEOUS DAILY
Status: DISCONTINUED | OUTPATIENT
Start: 2024-11-10 | End: 2024-11-10

## 2024-11-10 RX ADMIN — SODIUM CHLORIDE: 9 INJECTION, SOLUTION INTRAVENOUS at 14:53

## 2024-11-10 RX ADMIN — CEFEPIME 1000 MG: 1 INJECTION, POWDER, FOR SOLUTION INTRAMUSCULAR; INTRAVENOUS at 10:10

## 2024-11-10 RX ADMIN — CEFEPIME 1000 MG: 1 INJECTION, POWDER, FOR SOLUTION INTRAMUSCULAR; INTRAVENOUS at 20:32

## 2024-11-10 RX ADMIN — SODIUM CHLORIDE 1941 ML: 9 INJECTION, SOLUTION INTRAVENOUS at 10:09

## 2024-11-10 RX ADMIN — CEFEPIME 1000 MG: 1 INJECTION, POWDER, FOR SOLUTION INTRAMUSCULAR; INTRAVENOUS at 14:53

## 2024-11-10 RX ADMIN — VANCOMYCIN 1500 MG: 1.5 INJECTION, SOLUTION INTRAVENOUS at 11:04

## 2024-11-10 ASSESSMENT — LIFESTYLE VARIABLES
HOW MANY STANDARD DRINKS CONTAINING ALCOHOL DO YOU HAVE ON A TYPICAL DAY: PATIENT DOES NOT DRINK
HOW OFTEN DO YOU HAVE A DRINK CONTAINING ALCOHOL: NEVER

## 2024-11-10 NOTE — PROGRESS NOTES
[facility] -  Clinical Pharmacy Note    Vancomycin - Management by Pharmacy    Consult Date(s): 11/10  Consulting Provider(s): dr. Carmen    Assessment / Plan  Sepsis  - Vancomycin  Concurrent Antimicrobials: cefepime  Day of Vanc Therapy / Ordered Duration: 1/7  Current Dosing Method: Intermittent Dosing by Levels - DICK  Therapeutic Goal: Trough ~ 15 mg/L  Current Dose / Plan:   Pt appears to have DICK, SCr = 2.5 on admission; was <0.5 9/12/25  Pt loaded in ED with 1500 mg (~23 mg/kg) IV x1  Will plan to check level 11/11 AM to redose  Will continue to monitor clinical condition and make adjustments to regimen as appropriate.    Thank you for consulting pharmacy,    Please call with any questions,  Sydnee Tyelr, PharmD  PGY1 Pharmacy Resident  Wireless: [phone]  11/10/2024 12:14 PM        Interval update:  therapy initiation     Subjective/Objective:   Gaudencio Pat is a 64 y.o. male with a PMHx significant for HTN, prior hemorrhagic stroke, h/o admission for hematuria in September who presented from facility with abdominal distension, labored breathing. Pt hypotensive and tachycardic on admission with an DICK (SCr = 2.5; B/l <0.5) . Somewhat diaphoretic/clammy on presentaiton. Pt has chronic reina which was replaced in the ED with immediate return of sediment/purulent urine ~400 cc. Pt appears septic likely 2/2 UTI    Pharmacy is consulted to dose vancomycin.    Ht Readings from Last 1 Encounters:   11/10/24 1.626 m (5' 4\")     Wt Readings from Last 1 Encounters:   11/10/24 64.7 kg (142 lb 11.2 oz)     Current & Prior Antimicrobial Regimen(s):  Cefepime 11/10-current  Vanc - PTD (11/10-current)  1500 mg IV x1 (11/10)    Vancomycin Level(s) / Doses:    Date Time Dose Type of Level / Level Interpretation                 Note: Serum levels collected for AUC-based dosing may be high if collected in close proximity to the dose administered. This is not necessarily indicative of toxicity.    Cultures &

## 2024-11-10 NOTE — CONSULTS
Reason for Consult: hematuria, UTI, sepsis    History of Present Illness: Gaudencio Pat is a 64 y.o. male with history of a prior hemorrhagic stroke, non-verbal who came to the hospital for difficulty breathing and shortness of breath. He was hospitalized in September for concerns of Renata's but this was ruled out intraoperatively by Dr. Bower. Patient was found to have a bulbar urethral disruption from a traumatic reina catheter placement. He did follow up with Dr. Bower last month for reina catheter exchange and is due to see him on Friday for the next exchange with likely an eventual SPT placement. In the ED, his catheter was exchanged and found to be purulent output. CT with pyelonephritis and significant stool burden- needs aggressive bowel management. Creatinine is 2.5, urine culture pending. He did not arouse during my assessment. Family at bedside.       ROS:  JOSE    Past Medical History:   Past Medical History:   Diagnosis Date    Abnormal posture     Acute respiratory failure     Anemia     Aphasia     Dermatophytosis of nail     Diabetes mellitus (HCC)     Hemiplegia (HCC)     right sided    Hypertension     Ingrowing nail     Intracerebral hemorrhage (HCC)     Other specified disease of nail     Other specified disease of sebaceous glands     Unspecified cerebral artery occlusion with cerebral infarction     Unspecified nonsenile cataract        Past Surgical History:  Past Surgical History:   Procedure Laterality Date    CYSTOSCOPY N/A 9/12/2024    . performed by Kiko Bower MD at [facility] OR    SCROTAL SURGERY N/A 9/12/2024    PERINEAL EXPLORATION, CYSTOSCOPY, REINA PLACEMENT OVER WIRE, IRRIGATION AND DEBRIDMENT OF PERINEUM performed by Kiko Bower MD at [facility] OR       Social History:  Social History     Socioeconomic History    Marital status: Single     Spouse name: Not on file    Number of children: Not on file    Years of education: Not on file    Highest education level: Not  flush 0.9 % injection 5-40 mL, 5-40 mL, IntraVENous, 2 times per day  sodium chloride flush 0.9 % injection 5-40 mL, 5-40 mL, IntraVENous, PRN  0.9 % sodium chloride infusion, , IntraVENous, PRN  0.9 % sodium chloride IV bolus 1,941 mL, 30 mL/kg, IntraVENous, Once  cefepime (MAXIPIME) 1,000 mg in sodium chloride 0.9 % 50 mL IVPB (mini-bag), 1,000 mg, IntraVENous, Q12H  insulin lispro (HUMALOG,ADMELOG) injection vial 0-4 Units, 0-4 Units, SubCUTAneous, 4x Daily AC & HS  vancomycin (VANCOCIN) intermittent dosing (placeholder), , Other, RX Placeholder  [Held by provider] amLODIPine (NORVASC) tablet 5 mg, 5 mg, Oral, Daily  [Held by provider] aspirin EC tablet 81 mg, 81 mg, Oral, Daily  Baclofen (LIORESAL) tablet 5 mg, 5 mg, Oral, BID  [Held by provider] bisacodyl (DULCOLAX) EC tablet 10 mg, 10 mg, Oral, Daily PRN  [Held by provider] cloNIDine (CATAPRES) tablet 0.05 mg, 0.05 mg, Oral, BID  [Held by provider] losartan (COZAAR) tablet 50 mg, 50 mg, Oral, Daily  atorvastatin (LIPITOR) tablet 10 mg, 10 mg, Oral, Daily    Vitals:  BP 92/61   Pulse 95   Temp 98.6 °F (37 °C) (Oral)   Resp 20   Ht 1.626 m (5' 4\")   Wt 64.7 kg (142 lb 11.2 oz)   SpO2 99%   BMI 24.49 kg/m²     Intake/Output Summary (Last 24 hours) at 11/10/2024 1657  Last data filed at 11/10/2024 1549  Gross per 24 hour   Intake --   Output 1150 ml   Net -1150 ml       Physical Exam:  General Appearance: Asleep, contracted   Head: Normocephalic, without obvious abnormality, atraumatic  Back: no CVA tenderness  Lungs: respirations unlabored, no wheezing  Abdomen: Soft, non-tender, non-distended, no masses  Skin: Skin color, texture, turgor normal, no rashes or lesions  Neurologic: no gross deficits   Male :  Penis appears normal and circumcised  Urethral meatus is normal in size and location  Scrotum appears normal and both testicles appear normal in size and location    Labs:  CBC   Lab Results   Component Value Date/Time    WBC 14.8 11/10/2024 10:00 AM

## 2024-11-10 NOTE — ED PROVIDER NOTES
[facility]  EMERGENCY DEPARTMENT ENCOUNTER          ATTENDING PHYSICIAN NOTE       Date of evaluation: 11/10/2024    Chief Complaint     Shortness of Breath (Nursing home staff found patient breathing differently than he normally does. They also noted his abdomen was distended. )      History of Present Illness     Gaudencio Pat is a 64 y.o. male who presents to the emergency department with concerns for difficulty breathing and altered mental status.  The patient has a history of a prior hemorrhagic stroke and also has a history of having an admission in September of this year with hematuria and a CT that showed some gas on his pelvis initially concerning for Renata's however this was ruled out intraoperatively.  The patient is a resident of a nursing facility and according to staff and EMS report he had been normal and then this morning they noted that he was having more labored respirations and quicker respirations and that he was a bit less responsive with the distention in his abdomen.  They called 911 for transport.  The patient is unable to provide a history.    ASSESSMENT / PLAN  (MEDICAL DECISION MAKING)     INITIAL VITALS: BP: (!) 87/40, Temp: 99.4 °F (37.4 °C), Pulse: (!) 133, Respirations: 16, SpO2: 96 %      Gaudencio Pat is a 64 y.o. male who presented to the Emergency Department with concerns for abdominal distention and difficulty breathing.  On examination the patient has severe contracture in the bilateral upper extremities secondary to his prior hemorrhagic stroke.  He would regard to verbal stimulus was otherwise notably significantly tachycardic and somewhat diaphoretic and clammy.  The patient had a Gonzalez catheter in place with a large amount of sediment and purulent appearing urine.  His abdomen had some lower abdominal distention which was concerning for urinary retention.  His Gonzalez was removed and replaced and had immediate return of approximately 400 mL of purulent appearing  64-year-old male appears older than his stated age lying in bed appears chronically unwell  HEENT:  head is atraumatic, pupils equal round and reactive to light, sclera are clear, oropharynx is nonerythematous  Neck: No lymphadenopathy, trachea midline  Chest: No accessory muscle use patient is equal chest rise bilaterally he has diminished breath sounds throughout with some scattered rhonchi  Cardiovascular: Significantly tachycardic with a regular rhythm, 2+ radial pulses bilaterally, capillary refill 3 seconds  Abdominal: Soft, distention in the lower abdomen particularly with some associated tenderness to palpation patient will grimace with palpation  Skin: Cool clammy and mildly diaphoretic throughout  Musculoskeletal: Flexure contracture deformities of the bilateral upper extremities  Neurologic: The patient is alert and will regard with verbal stimulus and occasionally grunting grimace associated with pain he has contracture deformities of the bilateral upper extremities and increased tonicity his lower extremities have equal tone, no facial asymmetry                 Jani Johnson MD  11/10/24 1102       Jani Johnson MD  11/10/24 1101

## 2024-11-10 NOTE — H&P
V2.0  History and Physical      Name:  Gaudencio Pat /Age/Sex: 1960  (64 y.o. male)   MRN & CSN:  [id number] & [id number] Encounter Date/Time: 11/10/2024 12:00 PM EST   Location:  [facility]/A08- PCP: Celio Ortiz       [place] [facility] Day: 1    Assessment and Plan:       Assessment/plan:    Septic shock secondary to UTI:  Case discussed with ED provider admit to PCU  UA consistent with UTI,Patient with elevated heart rate and white blood cell.  Criteria for sepsis patient has lactic acid greater than 4 as well as endorgan damage meeting criteria for septic shock  Patient started on cefepime and and vancomycin  Pharmacy to dose vancomycin  Blood cultures have been sent  Monitor renal function while on vancomycin  Patient receiving 30 mL/kg  Repeat lactic acid per protocol  At this time with patient's map appropriate I am choosing to manage the patient on PCU versus admitting to intensive care unit.  Should their condition change would reconsider admitting to ICU at that time    DICK:  Metabolic acidosis with anion gap:  Discussed with nephrology seeing patient in consultation appreciate their input    Elevated BNP and troponin:  Suspect related to septic shock and stress  Chest x-ray without any findings as reviewed by me concerning for pulmonary edema  Clinically patient appears dry  Do not feel any additional workup is indicated at this time    UTI with hematuria:  Gonzalez placed with significant urinary retention  Given history of September admission of bulbar urethral disruption I am consulting urology and ordering CT abdomen and pelvis for further evaluation at this time    Right hemorrhagic stroke in  with left hemiparesis, dysarthria and dysphagia with bilateral upper and lower extremity chronic contractures:    Hypertension:  Hold home medication at this time    Hyperlipidemia:  Continue home medication    Diabetes mellitus type 2:  Currently not on any insulin  Will initiate low-dose  BS distended not tympanic  Genitourinary: no suprapubic tenderness  Gonzalez catheter in place with gross hematuria  Musculoskeletal: edema negative  Bilateral upper and lower contractures  Skin: warm, dry  Neuro: Patient is alert but not following any commands        Past Medical History:   PMHx   Past Medical History:   Diagnosis Date    Abnormal posture     Acute respiratory failure (HCC)     Anemia     Aphasia     Dermatophytosis of nail     Diabetes mellitus (HCC)     Hemiplegia (HCC)     right sided    Hypertension     Ingrowing nail     Intracerebral hemorrhage (HCC)     Other specified disease of nail     Other specified disease of sebaceous glands     Unspecified cerebral artery occlusion with cerebral infarction     Unspecified nonsenile cataract      PSHX:  has a past surgical history that includes Scrotal surgery (N/A, 9/12/2024) and Cystoscopy (N/A, 9/12/2024).  Allergies: No Known Allergies  Fam HX:  family history is not on file.  Soc HX:   Social History     Socioeconomic History    Marital status: Single   Tobacco Use    Smoking status: Unknown   Substance and Sexual Activity    Alcohol use: Not Currently       Medications:   Medications:    vancomycin  1,500 mg IntraVENous Once    sodium chloride flush  5-40 mL IntraVENous 2 times per day    enoxaparin  30 mg SubCUTAneous Daily    sodium chloride flush  5-40 mL IntraVENous 2 times per day    0.9 % sodium chloride  30 mL/kg IntraVENous Once    cefepime  2,000 mg IntraVENous Once    cefepime  2,000 mg IntraVENous Q8H    vancomycin  15 mg/kg IntraVENous Q12H      Infusions:    sodium chloride      sodium chloride      sodium chloride      norepinephrine       PRN Meds: sodium chloride flush, 5-40 mL, PRN  sodium chloride, , PRN  ondansetron, 4 mg, Q8H PRN   Or  ondansetron, 4 mg, Q6H PRN  polyethylene glycol, 17 g, Daily PRN  acetaminophen, 650 mg, Q6H PRN   Or  acetaminophen, 650 mg, Q6H PRN  potassium chloride, 40 mEq, PRN   Or  potassium alternative

## 2024-11-10 NOTE — ED NOTES
How does patient ambulate?   []Low Fall Risk (ambulates by themselves without support)  []Stand by assist   []Contact Guard   []Front wheel walker  []Wheelchair   []Steady  [x]Bed bound  []History of Lower Extremity Amputation  []Unknown, did not assess in the emergency department   How does patient take pills?  []Whole with Water  []Crushed in applesauce  []Crushed in pudding  []Other  [x]Unknown no oral medications were given in the ED  Is patient alert?   [x]Alert but non-interactive    []Drowsy but responds to voice  []Doesn't respond to voice but responds to painful stimuli  []Unresponsive  Is patient oriented?   []To person  []To place  []To time  []To situation  []Confused  []Agitated  [x] Unable to assess    If patient is disoriented or from a [facility] has family been notified of admission?   []Yes   [x]No  Patient belongings?   []Cell phone  []Wallet   []Dentures  [x]Clothing  Any specific patient or family belongings/needs/dynamics?   None   Miscellaneous comments/pending orders?  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 Nursing home staff noted labored respirations and abdominal distention, prompting emergency transport. On arrival, he was tachycardic (133 bpm), hypotensive (87/40 mmHg), and febrile (99.4°F), with significant lower abdominal distention and purulent urine following Gonzalez catheter replacement. Initial labs show acute renal failure (BUN 39, creatinine 2.5), metabolic acidosis (pH 7.25, lactate 5.2), and elevated WBC (14.8). Urinalysis suggests an acute UTI with significant leukocyte esterase, blood, and bacteria, consistent with the source of sepsis. Given his hemodynamic instability and septic shock, broad-spectrum antibiotics (cefepime and vancomycin) and aggressive

## 2024-11-10 NOTE — CONSULTS
Ph: [phone], Fax: [phone]           [facility]               Reason for admission:                 He was admitted with distended abdomen.  He also has altered mental status and difficulty breathing.    Brief Summary :     Gaudencio Pat is being seen by nephrology for acute kidney injury.      Interval History and plan:      Blood pressure is relatively low with tachycardia.    Plan:    Stop amlodipine, losartan and clonidine.  Continue antibiotics for sepsis with UTI.  Monitor vancomycin level.  Start IV fluid.  Start pressors to keep MAP greater than 65 mmHg.  Check urine sodium and creatinine.  No need for IV bicarbonate at this point.  DICK appears to be from prerenal state but ATN is also possible.  I will monitor for the need for dialysis.                     Assessment :     Acute kidney injury: He arrived with a creatinine of 2.5 with a BUN of 39.  Baseline creatinine is less than 1.    Risk factors for his acute kidney injury is sepsis with hypotension  He was on ARB in the face of hypotension can cause further perfusion deficit.     Metabolic acidosis: He has anion gap of 18 with a bicarb of 15.  Lactic acid on arrival was 5.2.    proBNP was elevated greater than 4000.    UTI with sepsis: He has leukocytosis, indwelling Gonzalez catheter, hypotension and tachycardia and urinalysis revealed moderate LE with bacteria and white cells.  Urine is frankly purulent     Hypertension but currently hypotensive: Will discontinue amlodipine, losartan and clonidine.    [facility] would like to thank Kyle Carmen MD   for opportunity to serve this patient      Please call with questions at-   24 Hrs Answering service [phone] or  7 am- 5 pm via Perfect serve or cell phone  Dr.Muhammad Jamey Earl MD       HPI :     Gaudencio Pat is a 64 y.o. male presented to   the hospital on 11/10/2024 with abdominal distention with altered mental status.    He has past medical

## 2024-11-10 NOTE — PLAN OF CARE
[facility] Hospitalist brief note  Consult received.  Case reviewed with ER physician    Full note to follow.    PCP is Celio Ortiz    Thanks  Donna Carmen MD

## 2024-11-10 NOTE — PROGRESS NOTES
4 Eyes Skin Assessment     NAME:  Gaudencio Pat  YOB: 1960  MEDICAL RECORD NUMBER:  [id number]    The patient is being assessed for  Admission    I agree that at least one RN has performed a thorough Head to Toe Skin Assessment on the patient. ALL assessment sites listed below have been assessed.      Areas assessed by both nurses:    Head, Face, Ears, Shoulders, Back, Chest, Arms, Elbows, Hands, Sacrum. Buttock, Coccyx, Ischium, Legs. Feet and Heels, and Under Medical Devices         Does the Patient have a Wound? Yes wound(s) were present on assessment. LDA wound assessment was Initiated and completed by RN    DTI left heel   Right groin stage 1  Scabs on bilateral shins   Right buttocks stage 1  Coccyx; stage 2 and an unstageable       Jeevan Prevention initiated by RN: Yes  Wound Care Orders initiated by RN: Yes    Pressure Injury (Stage 3,4, Unstageable, DTI, NWPT, and Complex wounds) if present, place Wound referral order by RN under : Yes    New Ostomies, if present place, Ostomy referral order under : No     Nurse 1 eSignature: Electronically signed by Althea Ramey RN on 11/10/24 at 2:18 PM EST    **SHARE this note so that the co-signing nurse can place an eSignature**    Nurse 2 eSignature: Electronically signed by Liz White RN on 11/10/24 at 4:22 PM EST

## 2024-11-11 LAB
25(OH)D3 SERPL-MCNC: 28.9 NG/ML
ALBUMIN SERPL-MCNC: 2.9 G/DL (ref 3.4–5)
ANION GAP SERPL CALCULATED.3IONS-SCNC: 12 MMOL/L (ref 3–16)
BUN SERPL-MCNC: 33 MG/DL (ref 7–20)
CALCIUM SERPL-MCNC: 8.7 MG/DL (ref 8.3–10.6)
CHLORIDE SERPL-SCNC: 116 MMOL/L (ref 99–110)
CO2 SERPL-SCNC: 15 MMOL/L (ref 21–32)
CREAT SERPL-MCNC: 1 MG/DL (ref 0.8–1.3)
DEPRECATED RDW RBC AUTO: 15.5 % (ref 12.4–15.4)
EKG ATRIAL RATE: 126 BPM
EKG DIAGNOSIS: NORMAL
EKG P AXIS: 60 DEGREES
EKG P-R INTERVAL: 142 MS
EKG Q-T INTERVAL: 240 MS
EKG QRS DURATION: 66 MS
EKG QTC CALCULATION (BAZETT): 347 MS
EKG R AXIS: -23 DEGREES
EKG T AXIS: 22 DEGREES
EKG VENTRICULAR RATE: 126 BPM
GFR SERPLBLD CREATININE-BSD FMLA CKD-EPI: 84 ML/MIN/{1.73_M2}
GLUCOSE BLD-MCNC: 79 MG/DL (ref 70–99)
GLUCOSE BLD-MCNC: 87 MG/DL (ref 70–99)
GLUCOSE BLD-MCNC: 90 MG/DL (ref 70–99)
GLUCOSE BLD-MCNC: 92 MG/DL (ref 70–99)
GLUCOSE SERPL-MCNC: 94 MG/DL (ref 70–99)
HCT VFR BLD AUTO: 35.7 % (ref 40.5–52.5)
HGB BLD-MCNC: 11.4 G/DL (ref 13.5–17.5)
LACTATE BLDV-SCNC: 1.4 MMOL/L (ref 0.4–1.9)
MCH RBC QN AUTO: 29.9 PG (ref 26–34)
MCHC RBC AUTO-ENTMCNC: 31.8 G/DL (ref 31–36)
MCV RBC AUTO: 94 FL (ref 80–100)
PERFORMED ON: NORMAL
PHOSPHATE SERPL-MCNC: 2.2 MG/DL (ref 2.5–4.9)
PLATELET # BLD AUTO: 99 K/UL (ref 135–450)
PMV BLD AUTO: 8.9 FL (ref 5–10.5)
POTASSIUM SERPL-SCNC: 4.1 MMOL/L (ref 3.5–5.1)
RBC # BLD AUTO: 3.8 M/UL (ref 4.2–5.9)
REPORT: NORMAL
SODIUM SERPL-SCNC: 143 MMOL/L (ref 136–145)
VANCOMYCIN SERPL-MCNC: 9 UG/ML
WBC # BLD AUTO: 14.4 K/UL (ref 4–11)

## 2024-11-11 PROCEDURE — 6370000000 HC RX 637 (ALT 250 FOR IP): Performed by: INTERNAL MEDICINE

## 2024-11-11 PROCEDURE — 80202 ASSAY OF VANCOMYCIN: CPT

## 2024-11-11 PROCEDURE — 82306 VITAMIN D 25 HYDROXY: CPT

## 2024-11-11 PROCEDURE — 82010 KETONE BODYS QUAN: CPT

## 2024-11-11 PROCEDURE — 6360000002 HC RX W HCPCS: Performed by: INTERNAL MEDICINE

## 2024-11-11 PROCEDURE — 2580000003 HC RX 258: Performed by: INTERNAL MEDICINE

## 2024-11-11 PROCEDURE — 2060000000 HC ICU INTERMEDIATE R&B

## 2024-11-11 PROCEDURE — 85027 COMPLETE CBC AUTOMATED: CPT

## 2024-11-11 PROCEDURE — 80069 RENAL FUNCTION PANEL: CPT

## 2024-11-11 PROCEDURE — 2580000003 HC RX 258: Performed by: STUDENT IN AN ORGANIZED HEALTH CARE EDUCATION/TRAINING PROGRAM

## 2024-11-11 PROCEDURE — 2500000003 HC RX 250 WO HCPCS: Performed by: STUDENT IN AN ORGANIZED HEALTH CARE EDUCATION/TRAINING PROGRAM

## 2024-11-11 PROCEDURE — 36415 COLL VENOUS BLD VENIPUNCTURE: CPT

## 2024-11-11 PROCEDURE — 83605 ASSAY OF LACTIC ACID: CPT

## 2024-11-11 RX ORDER — HEPARIN SODIUM 5000 [USP'U]/ML
5000 INJECTION, SOLUTION INTRAVENOUS; SUBCUTANEOUS EVERY 8 HOURS SCHEDULED
Status: DISCONTINUED | OUTPATIENT
Start: 2024-11-11 | End: 2024-11-13

## 2024-11-11 RX ORDER — POLYETHYLENE GLYCOL 3350 17 G/17G
17 POWDER, FOR SOLUTION ORAL 2 TIMES DAILY
Status: DISCONTINUED | OUTPATIENT
Start: 2024-11-11 | End: 2024-11-13

## 2024-11-11 RX ADMIN — CEFEPIME 2000 MG: 2 INJECTION, POWDER, FOR SOLUTION INTRAVENOUS at 17:04

## 2024-11-11 RX ADMIN — HEPARIN SODIUM 5000 UNITS: 5000 INJECTION INTRAVENOUS; SUBCUTANEOUS at 15:22

## 2024-11-11 RX ADMIN — ATORVASTATIN CALCIUM 10 MG: 20 TABLET, FILM COATED ORAL at 09:10

## 2024-11-11 RX ADMIN — ACETAMINOPHEN 650 MG: 650 SUPPOSITORY RECTAL at 11:38

## 2024-11-11 RX ADMIN — CEFEPIME 1000 MG: 1 INJECTION, POWDER, FOR SOLUTION INTRAMUSCULAR; INTRAVENOUS at 09:09

## 2024-11-11 RX ADMIN — BACLOFEN 5 MG: 5 TABLET ORAL at 09:10

## 2024-11-11 RX ADMIN — SODIUM CHLORIDE, PRESERVATIVE FREE 10 ML: 5 INJECTION INTRAVENOUS at 09:09

## 2024-11-11 RX ADMIN — HEPARIN SODIUM 5000 UNITS: 5000 INJECTION INTRAVENOUS; SUBCUTANEOUS at 20:12

## 2024-11-11 RX ADMIN — ONDANSETRON 4 MG: 2 INJECTION INTRAMUSCULAR; INTRAVENOUS at 01:38

## 2024-11-11 RX ADMIN — SODIUM BICARBONATE: 84 INJECTION, SOLUTION INTRAVENOUS at 11:29

## 2024-11-11 RX ADMIN — SODIUM CHLORIDE, PRESERVATIVE FREE 10 ML: 5 INJECTION INTRAVENOUS at 09:10

## 2024-11-11 RX ADMIN — SODIUM PHOSPHATE, MONOBASIC, MONOHYDRATE AND SODIUM PHOSPHATE, DIBASIC, ANHYDROUS 10 MMOL: 142; 276 INJECTION, SOLUTION INTRAVENOUS at 11:32

## 2024-11-11 RX ADMIN — SODIUM CHLORIDE: 9 INJECTION, SOLUTION INTRAVENOUS at 04:12

## 2024-11-11 NOTE — CONSULTS
Wound Referral Progress Note       NAME:  Gaudencio Pat  MEDICAL RECORD NUMBER:  [id number]  AGE: 64 y.o.   GENDER: male  : 1960  TODAY'S DATE:  2024    Subjective   Reason for WOCN Evaluation and Assessment: L Buttock - Stage 3      Gaudencio Pat is a 64 y.o. male referred by:   Nursing    Wound Identification:  Wound Type: pressure  Contributing Factors: diabetes, chronic pressure, decreased mobility, and shear force    Wound History: Gaudencio Pat is a 64 y.o. male with pmh of right hemorrhagic stroke in  with left hemiparesis, dysarthria, dysphagia and bilateral upper and lower extremities chronic contractures, history of hypertension, diabetes mellitus not on insulin at this time who was recently hospitalized in September for concerns of foreign years gangrene but this was ruled out patient had bulbar urethral disruption gross hematuria and severe constipation at that time who presents with altered mental status from the nursing home with shortness of breath and distended abdomen.  Patient was found in the ER to meet criteria for septic shock secondary to UTI having hematuria urinary retention and is being admitted at this time.   Current Wound Care Treatment:  L Buttock - silicone foam    Patient Care Goal:  Wound Healing        PAST MEDICAL HISTORY        Diagnosis Date    Abnormal posture     Acute respiratory failure     Anemia     Aphasia     Dermatophytosis of nail     Diabetes mellitus (HCC)     Hemiplegia (HCC)     right sided    Hypertension     Ingrowing nail     Intracerebral hemorrhage (HCC)     Other specified disease of nail     Other specified disease of sebaceous glands     Unspecified cerebral artery occlusion with cerebral infarction     Unspecified nonsenile cataract        PAST SURGICAL HISTORY    Past Surgical History:   Procedure Laterality Date    CYSTOSCOPY N/A 2024    . performed by Kiko Bower MD at [facility] OR    SCROTAL SURGERY N/A 2024

## 2024-11-11 NOTE — PROGRESS NOTES
Clinical Pharmacy Note: Renal Dose Change Evaluation per Pharmacy    Patient is a 64 y.o. male being treated with cefepime for indication sepsis, bacteremia.    Weight - Scale: 65.2 kg (143 lb 11.8 oz)   Height: 162.6 cm (5' 4\")  Lab Results   Component Value Date/Time    CREATININE 1.0 11/11/2024 09:15 AM    WBC 14.8 11/10/2024 10:00 AM     Estimated Creatinine Clearance: 63 mL/min (based on SCr of 1 mg/dL).     Based on today's assessment of renal function, change cefepime 1g q12h to cefepime 2g q8h per Renal Dosing Policy.    Pharmacy will continue to monitor renal function and adjust dosing changes as necessary.    Thank you,    Kaye Caballero, PharmD, BCPS  Clinical Pharmacist  [id number]    11/11/2024

## 2024-11-11 NOTE — PROGRESS NOTES
PT started to desat in the mid 70's, but came back up to the 90's before the nurse is able to place a NRB. Spoke with RT, was told to place pt on 3L NC. Provider notified.

## 2024-11-11 NOTE — PLAN OF CARE
Psychosocial CNS and Spiritual Care consult, as indicated  11/11/2024 0944 by Bertha Wise, RN  Outcome: Progressing  Flowsheets (Taken 11/11/2024 0944)  Effect of thought disturbance (confusion, delirium, dementia, or psychosis) are managed with adequate functional status:   Provide frequent short contacts to provide reality reorientation, refocusing and direction   If unable to ensure safety without constant attention obtain sitter and review sitter guidelines with assigned personnel     Problem: Skin/Tissue Integrity  Goal: Absence of new skin breakdown  Description: 1.  Monitor for areas of redness and/or skin breakdown  2.  Assess vascular access sites hourly  3.  Every 4-6 hours minimum:  Change oxygen saturation probe site  4.  Every 4-6 hours:  If on nasal continuous positive airway pressure, respiratory therapy assess nares and determine need for appliance change or resting period.  11/11/2024 0944 by Bertha Wise, RN  Outcome: Progressing     Problem: Pain  Goal: Verbalizes/displays adequate comfort level or baseline comfort level  11/11/2024 0944 by Bertha Wise, RN  Outcome: Progressing  11/11/2024 0104 by Joe Rodriguez, RN  Outcome: Progressing  Flowsheets (Taken 11/11/2024 0104)  Verbalizes/displays adequate comfort level or baseline comfort level:   Encourage patient to monitor pain and request assistance   Assess pain using appropriate pain scale   Administer analgesics based on type and severity of pain and evaluate response

## 2024-11-11 NOTE — PROGRESS NOTES
Urology Attending Progress Note      Subjective: Appears comfortable. Unable to answer questions     Vitals:  /80   Pulse 99   Temp 98.2 °F (36.8 °C) (Oral)   Resp 20   Ht 1.626 m (5' 4\")   Wt 65.2 kg (143 lb 11.8 oz)   SpO2 94%   BMI 24.67 kg/m²   Temp  Av.9 °F (37.2 °C)  Min: 97.9 °F (36.6 °C)  Max: 100.6 °F (38.1 °C)    Intake/Output Summary (Last 24 hours) at 2024 0834  Last data filed at 2024 0630  Gross per 24 hour   Intake 0 ml   Output 2100 ml   Net -2100 ml       Exam: Urine clear yellow in reina    Labs:  WBC:    Lab Results   Component Value Date/Time    WBC 14.8 11/10/2024 10:00 AM     Hemoglobin/Hematocrit:    Lab Results   Component Value Date/Time    HGB 14.7 11/10/2024 10:00 AM    HCT 47.0 11/10/2024 10:00 AM     BMP:    Lab Results   Component Value Date/Time     11/10/2024 10:00 AM    K 4.1 11/10/2024 11:00 AM     11/10/2024 10:00 AM    CO2 15 11/10/2024 10:00 AM    BUN 39 11/10/2024 10:00 AM    CREATININE 2.5 11/10/2024 10:00 AM    CALCIUM 9.8 11/10/2024 10:00 AM    GFRAA 151 2013 10:12 PM    LABGLOM 28 11/10/2024 10:00 AM     PT/INR:    Lab Results   Component Value Date/Time    PROTIME 13.4 2024 12:11 AM    INR 1.00 2024 12:11 AM     PTT:  No results found for: \"APTT\"[APTT    Blood Culture:  Proteus      Imaging: CT abdomen/p[ricarda 11/10/24  IMPRESSION:     More prominent bilateral symmetric perinephric fat stranding with very mild dilatation of the intrarenal collecting systems and ureters. Correlate for potential pyelonephritis.     Subtle hyperdense material within the urinary bladder, potentially blood products versus less likely contrast material from recent contrast administration or calculi.     Large amount of stool in the rectum concerning for impaction without significant obstruction at this time.        Impression/Plan: 63yo M with UTI/sepsis, DICK and reported hematuria.    -Urine appears clear yellow in reina today. No  evidence of hematuria on exam  -Blood culture positive for Proteus. Urine pending. Abx per primary  -Labs pending  -Gonzalez exchanged in the ED  -Keep FU appointment with Dr. Bower for Friday this week. If he is still hospitalized we will reschedule this for later date. At this point, will see PRN for now. Please call with any questions/concerns.     IRAM Blanc

## 2024-11-11 NOTE — PROGRESS NOTES
Comprehensive Nutrition Assessment    RECOMMENDATIONS:  PO Diet: Modify to Dysphagia-Soft and Bite Sized   Nutrition Supplement: Modify to Ensure Max tid(Strawberry)  Nutrition Education: No recommendation at this time     NUTRITION ASSESSMENT:   Nutritional summary & status: Follow-up. Pt admitted with AMS, SOB, and distended abd from NH.  Pt on a Regular diet, however, received Dysphagia-Soft and Bite Size on previous admission. PO intake 0% since admit. Mother at bedside who reports pt usually is on a Dysphagia Soft diet(pt nonverbal). EMR showing weight loss of 4.6% in 2 months, not triggering as significant.  Increased protein needs identified due to DTI(lt heel) and PI st 3 lt buttock .Will modify diet to Dysphagia-Soft and Bite Size and modify supplement to Ensure Max (strawberry) tid to promote adequate nutrition and healing of skin. Continue to monitor.   Admission // PMH: Septic Shock//ARF, Aphasia, DM, HTN    MALNUTRITION ASSESSMENT  Context of Malnutrition: Acute Illness   Malnutrition Status: At risk for malnutrition  Findings of the 6 clinical characteristics of malnutrition (Minimum of 2 out of 6 clinical characteristics is required to make the diagnosis of moderate or severe Protein Calorie Malnutrition based on AND/ASPEN Guidelines):  Energy Intake:  Mild decrease in energy intake  Weight Loss:  Mild weight loss (4.6% in 2 months)     Body Fat Loss:  No body fat loss     Muscle Mass Loss:  No muscle mass loss      NUTRITION DIAGNOSIS   Inadequate oral intake related to acute injury/trauma (sepsis) as evidenced by intake 0-25%    Nutrition Monitoring and Evaluation:   Food/Nutrient Intake Outcomes:  Food and Nutrient Intake, Supplement Intake  Physical Signs/Symptoms Outcomes:  Biochemical Data, Nutrition Focused Physical Findings, Skin, Weight     OBJECTIVE DATA: Significant to nutrition assessment  Nutrition Related Findings: LBM11/10.No edema. Phos 2.2.  Wounds: Pressure Injury, Stage III, Deep

## 2024-11-11 NOTE — PROGRESS NOTES
Attempted to call patient facility ( [facility]) [phone] to get an updated medication list. No answer.

## 2024-11-11 NOTE — PROGRESS NOTES
Department of Pharmacy    Notification received from laboratory of positive blood culture results.    Organism(s) detected: Proteus species    No changes recommended at this time, current antimicrobial therapy (Cefepime) provides adequate coverage.     Pia Chavez, PharmD  [id number] (Main Pharmacy)

## 2024-11-11 NOTE — PLAN OF CARE
Problem: Chronic Conditions and Co-morbidities  Goal: Patient's chronic conditions and co-morbidity symptoms are monitored and maintained or improved  Outcome: Progressing  Flowsheets (Taken 11/10/2024 1944)  Care Plan - Patient's Chronic Conditions and Co-Morbidity Symptoms are Monitored and Maintained or Improved:   Monitor and assess patient's chronic conditions and comorbid symptoms for stability, deterioration, or improvement   Collaborate with multidisciplinary team to address chronic and comorbid conditions and prevent exacerbation or deterioration   Update acute care plan with appropriate goals if chronic or comorbid symptoms are exacerbated and prevent overall improvement and discharge     Problem: Safety - Adult  Goal: Free from fall injury  Outcome: Progressing  Flowsheets (Taken 11/10/2024 1944)  Free From Fall Injury: Instruct family/caregiver on patient safety       Problem: Confusion  Goal: Confusion, delirium, dementia, or psychosis is improved or at baseline  Description: INTERVENTIONS:  1. Assess for possible contributors to thought disturbance, including medications, impaired vision or hearing, underlying metabolic abnormalities, dehydration, psychiatric diagnoses, and notify attending LIP  2. [place] high risk fall precautions, as indicated  3. Provide frequent short contacts to provide reality reorientation, refocusing and direction  4. Decrease environmental stimuli, including noise as appropriate  5. Monitor and intervene to maintain adequate nutrition, hydration, elimination, sleep and activity  6. If unable to ensure safety without constant attention obtain sitter and review sitter guidelines with assigned personnel  7. Initiate Psychosocial CNS and Spiritual Care consult, as indicated  11/10/2024 1945 by Liz White RN  Flowsheets (Taken 11/10/2024 1945)  Effect of thought disturbance (confusion, delirium, dementia, or psychosis) are managed with adequate functional status:   Assess

## 2024-11-11 NOTE — SIGNIFICANT EVENT
Per RN, patient desaturating now requiring on 3 L NC  BP stable  Stopped IVF.   On broad spectrum abx.   Will monitor

## 2024-11-11 NOTE — PROGRESS NOTES
Ph: [phone], Fax: [phone]           [facility]               Reason for admission:                 He was admitted with distended abdomen.  He also has altered mental status and difficulty breathing.    Brief Summary :     Gaudencio Pat is being seen by nephrology for acute kidney injury.      Interval History and plan:   Patient seen at bedside  Comfortable  /76  On 3 to 4 lpm oxygen  Urine output 2100 ml  Cr better 2.5 >  1.0  Bicarbonate 15 > 15 with Cl 116  Phos 2.2                Hold amlodipine, losartan and clonidine.  Continue antibiotics for sepsis with UTI/Pyelonephritis.  Monitor vancomycin level.  Follow BP  Avoid hyperchloremic IVF as developing acidosis  Patient does not appear IV volume overloaded and will given 1 liter of bicarbonate infusion with dextrose slowly.   Check beta hydroxybutyrate   Follow BP.   Replete lytes as needed  Check vit D and replete is low                       Assessment :     Acute kidney injury: He arrived with a creatinine of 2.5 with a BUN of 39.  Baseline creatinine is less than 1.    Risk factors for his acute kidney injury is sepsis with hypotension  He was on ARB in the face of hypotension can cause further perfusion deficit.  DICK appears to be from prerenal state but ATN is also possible.      Metabolic acidosis: He has anion gap of 18 with a bicarb of 15.  Lactic acid on arrival was 5.2.    proBNP was elevated greater than 4000.    UTI with sepsis: He has leukocytosis, indwelling Gonzalez catheter, hypotension and tachycardia and urinalysis revealed moderate LE with bacteria and white cells.  Urine is frankly purulent     Hypertension but currently hypotensive: Will discontinue amlodipine, losartan and clonidine.    [facility] would like to thank Kyle Carmen MD   for opportunity to serve this patient      Please call with questions at-   24 Hrs Answering service [phone] or  7 am- 5 pm via Perfect serve or  ondansetron (ZOFRAN) injection 4 mg, 4 mg, IntraVENous, Q6H PRN  polyethylene glycol (GLYCOLAX) packet 17 g, 17 g, Oral, Daily PRN  acetaminophen (TYLENOL) tablet 650 mg, 650 mg, Oral, Q6H PRN **OR** acetaminophen (TYLENOL) suppository 650 mg, 650 mg, Rectal, Q6H PRN  sodium chloride flush 0.9 % injection 5-40 mL, 5-40 mL, IntraVENous, 2 times per day  sodium chloride flush 0.9 % injection 5-40 mL, 5-40 mL, IntraVENous, PRN  0.9 % sodium chloride infusion, , IntraVENous, PRN  cefepime (MAXIPIME) 1,000 mg in sodium chloride 0.9 % 50 mL IVPB (mini-bag), 1,000 mg, IntraVENous, Q12H  insulin lispro (HUMALOG,ADMELOG) injection vial 0-4 Units, 0-4 Units, SubCUTAneous, 4x Daily AC & HS  vancomycin (VANCOCIN) intermittent dosing (placeholder), , Other, RX Placeholder  [Held by provider] amLODIPine (NORVASC) tablet 5 mg, 5 mg, Oral, Daily  [Held by provider] aspirin EC tablet 81 mg, 81 mg, Oral, Daily  Baclofen (LIORESAL) tablet 5 mg, 5 mg, Oral, BID  [Held by provider] bisacodyl (DULCOLAX) EC tablet 10 mg, 10 mg, Oral, Daily PRN  [Held by provider] cloNIDine (CATAPRES) tablet 0.05 mg, 0.05 mg, Oral, BID  [Held by provider] losartan (COZAAR) tablet 50 mg, 50 mg, Oral, Daily  atorvastatin (LIPITOR) tablet 10 mg, 10 mg, Oral, Daily    Vitals :     Vitals:    11/11/24 0854   BP: 133/76   Pulse: (!) 114   Resp: 20   Temp: 99.9 °F (37.7 °C)   SpO2: 95%          Physical Examination :     Appearance: NAD. Sleepy and non verbal.   Respiratory:  No RD. On 3- 4 lpm oxygen. Lungs appear clear.   Cardiovascular: Edema no  Abdomen: -  soft  Other relevant findings: contracted posture      Labs :     CBC:   Recent Labs     11/10/24  1000   WBC 14.8*   HGB 14.7   HCT 47.0   *     BMP:    Recent Labs     11/10/24  1000 11/10/24  1100     --    K see below 4.1     --    CO2 15*  --    BUN 39*  --    CREATININE 2.5*  --    GLUCOSE 143*  --    MG 1.93  --      Lab Results   Component Value Date/Time    COLORU Yellow

## 2024-11-11 NOTE — PROGRESS NOTES
Patient will not open his mouth for food or medication. Notified Dr. Carmen via perfect serve. Miralax not given.

## 2024-11-11 NOTE — PROGRESS NOTES
Clinical Pharmacy Progress Note    Vancomcyin has been discontinued. Pharmacy will sign off. Please re-consult pharmacy if vancomycin dosing is wanted in the future.    Please call with questions.  Kaye Caballero, PharmD, BCPS  Clinical Pharmacist  [id number]

## 2024-11-11 NOTE — PROGRESS NOTES
V2.0    [facility] Progress Note      Name:  Gaudencio Pat /Age/Sex: 1960  (64 y.o. male)   MRN & CSN:  [id number] & [id number] Encounter Date/Time: 2024 6:12 AM EST   Location:  [facility]- PCP: Celio Ortiz     Attending:Kyle Carmen MD       Hospital Day: 2    Assessment and Recommendations       Assessment/Plan:      Septic shock secondary to UTI: at time of admission:   Case discussed with ED provider admit to PCU  UA consistent with UTI,Patient with elevated heart rate and white blood cell.  Criteria for sepsis patient has lactic acid greater than 4 as well as endorgan damage meeting criteria for septic shock  Patient started on cefepime and and vancomycin  Pharmacy to dose vancomycin  Blood cultures have been sent  Monitor renal function while on vancomycin  Patient receiving 30 mL/kg  Repeat lactic acid 1.3 y 11/10  11/11: Septic shock resolved     DICK:  HAGMA:  Reviewed note from nephrology  Continue to hold amlodipine losartan and clonidine  Creatinine down from 2.5 currently 1.0  Bicarb 15  Avoid hyperchloremic IV fluids as patient is developing acidosis  Patient on sodium bicarb IV     UTI with hematuria:  Gonzalez placed with significant urinary retention  Given history of September admission of bulbar urethral CT done on admission of abd and pelvic which was unremarkable for any concerns regarding this but did show constipation  Case discussed with pharmacy  Urine culture showing Proteus  DC vancomycin and continue cefepime  Urine is partha and clear at this time    Constipation:  Will start with soapsuds enema  And routine MiraLAX    Urinary retention:  Gonzalez exchanged in ED  Reviewed note from urology  Recommend keep follow-up appointment with Dr. Bower on Friday if discharged by then for discussion of SPT placement    Elevated BNP and troponin:  Suspect related to septic shock and stress  Chest x-ray without any findings as reviewed by me concerning for pulmonary edema  Clinically  WALL: Interval resolution of emphysema in the peritoneum and scrotum since prior study from September 2024. BONES: No destructive process.     More prominent bilateral symmetric perinephric fat stranding with very mild dilatation of the intrarenal collecting systems and ureters. Correlate for potential pyelonephritis. Subtle hyperdense material within the urinary bladder, potentially blood products versus less likely contrast material from recent contrast administration or calculi. Large amount of stool in the rectum concerning for impaction without significant obstruction at this time.  Electronically signed by Perry Najera MD    XR CHEST PORTABLE    Result Date: 11/10/2024  PORTABLE AP CHEST X-RAY: REASON FOR EXAM: altered mental status COMPARISON: May 18, 2013 FINDINGS: Portable AP view of the chest demonstrates a normal cardiomediastinal silhouette. Patient hands obscure the lung bases partially. Calcific density in the right midlung may be in the chest wall or large calcified granuloma. No acute appearing airspace disease.Normal pulmonary vascularity. No pleural effusion or acute bony abnormality.     No acute process. Electronically signed by Perry Najera MD      CBC:   Recent Labs     11/10/24  1000   WBC 14.8*   HGB 14.7   *     BMP:    Recent Labs     11/10/24  1000 11/10/24  1100     --    K see below 4.1     --    CO2 15*  --    BUN 39*  --    CREATININE 2.5*  --    GLUCOSE 143*  --      Hepatic:   Recent Labs     11/10/24  1000   AST 55*   ALT see below   BILITOT 0.7   ALKPHOS 87     Lipids:   Lab Results   Component Value Date/Time    CHOL 235 05/19/2013 06:30 AM    HDL 44 05/19/2013 06:30 AM    TRIG 103 05/19/2013 06:30 AM     Hemoglobin A1C:   Lab Results   Component Value Date/Time    LABA1C 5.1 05/19/2013 06:30 AM     TSH: No results found for: \"TSH\"  Troponin: No results found for: \"TROPONINT\"  Lactic Acid:   Recent Labs     11/10/24  0953 11/10/24  1156   LACTA 5.2* 1.3

## 2024-11-11 NOTE — PLAN OF CARE
Problem: Chronic Conditions and Co-morbidities  Goal: Patient's chronic conditions and co-morbidity symptoms are monitored and maintained or improved  11/11/2024 0104 by Joe Rodriguez RN  Outcome: Progressing  Flowsheets (Taken 11/11/2024 0104)  Care Plan - Patient's Chronic Conditions and Co-Morbidity Symptoms are Monitored and Maintained or Improved:   Monitor and assess patient's chronic conditions and comorbid symptoms for stability, deterioration, or improvement   Collaborate with multidisciplinary team to address chronic and comorbid conditions and prevent exacerbation or deterioration  11/10/2024 1944 by Liz White RN  Outcome: Progressing  Flowsheets (Taken 11/10/2024 1944)  Care Plan - Patient's Chronic Conditions and Co-Morbidity Symptoms are Monitored and Maintained or Improved:   Monitor and assess patient's chronic conditions and comorbid symptoms for stability, deterioration, or improvement   Collaborate with multidisciplinary team to address chronic and comorbid conditions and prevent exacerbation or deterioration   Update acute care plan with appropriate goals if chronic or comorbid symptoms are exacerbated and prevent overall improvement and discharge     Problem: Discharge Planning  Goal: Discharge to home or other facility with appropriate resources  Outcome: Progressing  Flowsheets (Taken 11/11/2024 0104)  Discharge to home or other facility with appropriate resources:   Identify barriers to discharge with patient and caregiver   Arrange for needed discharge resources and transportation as appropriate     Problem: Safety - Adult  Goal: Free from fall injury  11/11/2024 0104 by Joe Rodriguez, RN  Outcome: Progressing  Flowsheets (Taken 11/11/2024 0104)  Free From Fall Injury:   Instruct family/caregiver on patient safety   Based on caregiver fall risk screen, instruct family/caregiver to ask for assistance with transferring infant if caregiver noted to have fall risk

## 2024-11-11 NOTE — PROGRESS NOTES
Notified Dr. Carmen, via perfect serve, that Miralax was not given as patient will not take anything by mouth.

## 2024-11-11 NOTE — CARE COORDINATION
Case Management Assessment  Initial Evaluation    Date/Time of Evaluation: 11/11/2024 3:39 PM  Assessment Completed by: Aly Dubose RN    If patient is discharged prior to next notation, then this note serves as note for discharge by case management.    Patient Name: Gaudencio Pat                   YOB: 1960  Diagnosis: Septicemia (HCC) [A41.9]  Acute UTI [N39.0]  Acute urinary retention [R33.8]  Septic shock (HCC) [A41.9, R65.21]  Acute renal failure, unspecified acute renal failure type (HCC) [N17.9]                   Date / Time: 11/10/2024  9:18 AM    Patient Admission Status: Inpatient   Readmission Risk (Low < 19, Mod (19-27), High > 27): Readmission Risk Score: 16.7    Current PCP: Celio Ortiz  PCP verified by CM? Yes    Chart Reviewed: Yes      History Provided by: Medical Record  Patient Orientation: Alert and Oriented, Person    Patient Cognition: Alert    Hospitalization in the last 30 days (Readmission):  No    If yes, Readmission Assessment in CM Navigator will be completed.    Advance Directives:      Code Status: Full Code   Patient's Primary Decision Maker is: Named in Scanned ACP Document    Primary Decision Maker: Avelina Colbert - Parent, Legal Guardian - [phone]    Discharge Planning:    Patient lives with: Alone Type of Home: Skilled Nursing Facility  Primary Care Giver: Other (Comment) (Staff at [facility])  Patient Support Systems include: Family Members   Current Financial resources:    Current community resources:    Current services prior to admission: Skilled Nursing Facility            Current DME:              Type of Home Care services:  Skilled Therapy, OT, PT    ADLS  Prior functional level: Assistance with the following:, Bathing, Dressing, Feeding, Toileting, Cooking, Housework, Shopping, Mobility  Current functional level: Assistance with the following:, Mobility, Shopping, Housework, Feeding, Cooking, Toileting, Dressing, Bathing    PT AM-PAC:

## 2024-11-12 LAB
ALBUMIN SERPL-MCNC: 3.1 G/DL (ref 3.4–5)
ALBUMIN SERPL-MCNC: 3.4 G/DL (ref 3.4–5)
ALBUMIN/GLOB SERPL: 0.8 {RATIO} (ref 1.1–2.2)
ALP SERPL-CCNC: 110 U/L (ref 40–129)
ALT SERPL-CCNC: 17 U/L (ref 10–40)
ANION GAP SERPL CALCULATED.3IONS-SCNC: 11 MMOL/L (ref 3–16)
ANION GAP SERPL CALCULATED.3IONS-SCNC: 15 MMOL/L (ref 3–16)
AST SERPL-CCNC: 48 U/L (ref 15–37)
BACTERIA UR CULT: ABNORMAL
BACTERIA UR CULT: ABNORMAL
BETA-HYDROXYBUTYRATE: 1.16 MMOL/L (ref 0–0.27)
BILIRUB SERPL-MCNC: 0.6 MG/DL (ref 0–1)
BUN SERPL-MCNC: 13 MG/DL (ref 7–20)
BUN SERPL-MCNC: 23 MG/DL (ref 7–20)
CALCIUM SERPL-MCNC: 9 MG/DL (ref 8.3–10.6)
CALCIUM SERPL-MCNC: 9.4 MG/DL (ref 8.3–10.6)
CHLORIDE SERPL-SCNC: 104 MMOL/L (ref 99–110)
CHLORIDE SERPL-SCNC: 112 MMOL/L (ref 99–110)
CO2 SERPL-SCNC: 23 MMOL/L (ref 21–32)
CO2 SERPL-SCNC: 24 MMOL/L (ref 21–32)
CREAT SERPL-MCNC: 0.5 MG/DL (ref 0.8–1.3)
CREAT SERPL-MCNC: 0.6 MG/DL (ref 0.8–1.3)
DEPRECATED RDW RBC AUTO: 14.7 % (ref 12.4–15.4)
GFR SERPLBLD CREATININE-BSD FMLA CKD-EPI: >90 ML/MIN/{1.73_M2}
GFR SERPLBLD CREATININE-BSD FMLA CKD-EPI: >90 ML/MIN/{1.73_M2}
GLUCOSE BLD-MCNC: 107 MG/DL (ref 70–99)
GLUCOSE BLD-MCNC: 110 MG/DL (ref 70–99)
GLUCOSE BLD-MCNC: 113 MG/DL (ref 70–99)
GLUCOSE BLD-MCNC: 93 MG/DL (ref 70–99)
GLUCOSE BLD-MCNC: 97 MG/DL (ref 70–99)
GLUCOSE SERPL-MCNC: 114 MG/DL (ref 70–99)
GLUCOSE SERPL-MCNC: 99 MG/DL (ref 70–99)
HCT VFR BLD AUTO: 35 % (ref 40.5–52.5)
HGB BLD-MCNC: 11.3 G/DL (ref 13.5–17.5)
LACTATE BLDV-SCNC: 0.7 MMOL/L (ref 0.4–2)
MAGNESIUM SERPL-MCNC: 1.84 MG/DL (ref 1.8–2.4)
MCH RBC QN AUTO: 29.6 PG (ref 26–34)
MCHC RBC AUTO-ENTMCNC: 32.2 G/DL (ref 31–36)
MCV RBC AUTO: 91.8 FL (ref 80–100)
ORGANISM: ABNORMAL
ORGANISM: ABNORMAL
PERFORMED ON: ABNORMAL
PERFORMED ON: NORMAL
PERFORMED ON: NORMAL
PHOSPHATE SERPL-MCNC: 1.8 MG/DL (ref 2.5–4.9)
PLATELET # BLD AUTO: 96 K/UL (ref 135–450)
PMV BLD AUTO: 9.7 FL (ref 5–10.5)
POTASSIUM SERPL-SCNC: 3 MMOL/L (ref 3.5–5.1)
POTASSIUM SERPL-SCNC: 3.6 MMOL/L (ref 3.5–5.1)
PROT SERPL-MCNC: 7.7 G/DL (ref 6.4–8.2)
RBC # BLD AUTO: 3.81 M/UL (ref 4.2–5.9)
SODIUM SERPL-SCNC: 143 MMOL/L (ref 136–145)
SODIUM SERPL-SCNC: 146 MMOL/L (ref 136–145)
TROPONIN, HIGH SENSITIVITY: 24 NG/L (ref 0–22)
WBC # BLD AUTO: 11.8 K/UL (ref 4–11)

## 2024-11-12 PROCEDURE — 84484 ASSAY OF TROPONIN QUANT: CPT

## 2024-11-12 PROCEDURE — 83605 ASSAY OF LACTIC ACID: CPT

## 2024-11-12 PROCEDURE — 2580000003 HC RX 258: Performed by: STUDENT IN AN ORGANIZED HEALTH CARE EDUCATION/TRAINING PROGRAM

## 2024-11-12 PROCEDURE — 2500000003 HC RX 250 WO HCPCS: Performed by: STUDENT IN AN ORGANIZED HEALTH CARE EDUCATION/TRAINING PROGRAM

## 2024-11-12 PROCEDURE — 87040 BLOOD CULTURE FOR BACTERIA: CPT

## 2024-11-12 PROCEDURE — 99222 1ST HOSP IP/OBS MODERATE 55: CPT | Performed by: INTERNAL MEDICINE

## 2024-11-12 PROCEDURE — 80053 COMPREHEN METABOLIC PANEL: CPT

## 2024-11-12 PROCEDURE — 2060000000 HC ICU INTERMEDIATE R&B

## 2024-11-12 PROCEDURE — 85027 COMPLETE CBC AUTOMATED: CPT

## 2024-11-12 PROCEDURE — 93005 ELECTROCARDIOGRAM TRACING: CPT | Performed by: STUDENT IN AN ORGANIZED HEALTH CARE EDUCATION/TRAINING PROGRAM

## 2024-11-12 PROCEDURE — 83735 ASSAY OF MAGNESIUM: CPT

## 2024-11-12 PROCEDURE — 6370000000 HC RX 637 (ALT 250 FOR IP): Performed by: STUDENT IN AN ORGANIZED HEALTH CARE EDUCATION/TRAINING PROGRAM

## 2024-11-12 PROCEDURE — 6360000002 HC RX W HCPCS: Performed by: STUDENT IN AN ORGANIZED HEALTH CARE EDUCATION/TRAINING PROGRAM

## 2024-11-12 PROCEDURE — 92610 EVALUATE SWALLOWING FUNCTION: CPT

## 2024-11-12 PROCEDURE — 6360000002 HC RX W HCPCS: Performed by: INTERNAL MEDICINE

## 2024-11-12 PROCEDURE — 2580000003 HC RX 258: Performed by: INTERNAL MEDICINE

## 2024-11-12 PROCEDURE — 36415 COLL VENOUS BLD VENIPUNCTURE: CPT

## 2024-11-12 RX ORDER — LABETALOL HYDROCHLORIDE 5 MG/ML
10 INJECTION, SOLUTION INTRAVENOUS EVERY 6 HOURS PRN
Status: DISCONTINUED | OUTPATIENT
Start: 2024-11-12 | End: 2024-11-12

## 2024-11-12 RX ORDER — AMLODIPINE BESYLATE 10 MG/1
10 TABLET ORAL DAILY
Status: DISCONTINUED | OUTPATIENT
Start: 2024-11-12 | End: 2024-11-12

## 2024-11-12 RX ORDER — LABETALOL HYDROCHLORIDE 5 MG/ML
10 INJECTION, SOLUTION INTRAVENOUS EVERY 6 HOURS PRN
Status: DISCONTINUED | OUTPATIENT
Start: 2024-11-12 | End: 2024-11-13

## 2024-11-12 RX ORDER — SODIUM CHLORIDE 0.9 % (FLUSH) 0.9 %
5-40 SYRINGE (ML) INJECTION PRN
Status: DISCONTINUED | OUTPATIENT
Start: 2024-11-12 | End: 2024-11-13

## 2024-11-12 RX ORDER — LIDOCAINE HYDROCHLORIDE 10 MG/ML
50 INJECTION, SOLUTION EPIDURAL; INFILTRATION; INTRACAUDAL; PERINEURAL ONCE
Status: DISCONTINUED | OUTPATIENT
Start: 2024-11-12 | End: 2024-11-13

## 2024-11-12 RX ORDER — DEXTROSE MONOHYDRATE AND SODIUM CHLORIDE 5; .45 G/100ML; G/100ML
INJECTION, SOLUTION INTRAVENOUS CONTINUOUS
Status: DISCONTINUED | OUTPATIENT
Start: 2024-11-12 | End: 2024-11-12

## 2024-11-12 RX ORDER — SODIUM CHLORIDE 9 MG/ML
INJECTION, SOLUTION INTRAVENOUS PRN
Status: DISCONTINUED | OUTPATIENT
Start: 2024-11-12 | End: 2024-11-13

## 2024-11-12 RX ORDER — SODIUM CHLORIDE 0.9 % (FLUSH) 0.9 %
5-40 SYRINGE (ML) INJECTION EVERY 12 HOURS SCHEDULED
Status: DISCONTINUED | OUTPATIENT
Start: 2024-11-12 | End: 2024-11-13

## 2024-11-12 RX ORDER — CLONIDINE 0.1 MG/24H
1 PATCH, EXTENDED RELEASE TRANSDERMAL WEEKLY
Status: DISCONTINUED | OUTPATIENT
Start: 2024-11-12 | End: 2024-11-13

## 2024-11-12 RX ORDER — METOPROLOL TARTRATE 1 MG/ML
2.5 INJECTION, SOLUTION INTRAVENOUS EVERY 6 HOURS
Status: DISCONTINUED | OUTPATIENT
Start: 2024-11-12 | End: 2024-11-13

## 2024-11-12 RX ORDER — POTASSIUM CHLORIDE 7.45 MG/ML
10 INJECTION INTRAVENOUS
Status: DISCONTINUED | OUTPATIENT
Start: 2024-11-12 | End: 2024-11-13

## 2024-11-12 RX ORDER — DEXTROSE MONOHYDRATE AND SODIUM CHLORIDE 5; .45 G/100ML; G/100ML
INJECTION, SOLUTION INTRAVENOUS CONTINUOUS
Status: DISCONTINUED | OUTPATIENT
Start: 2024-11-12 | End: 2024-11-13

## 2024-11-12 RX ADMIN — POTASSIUM CHLORIDE 10 MEQ: 10 INJECTION, SOLUTION INTRAVENOUS at 20:24

## 2024-11-12 RX ADMIN — CEFEPIME 2000 MG: 2 INJECTION, POWDER, FOR SOLUTION INTRAVENOUS at 08:04

## 2024-11-12 RX ADMIN — POTASSIUM CHLORIDE 10 MEQ: 10 INJECTION, SOLUTION INTRAVENOUS at 21:32

## 2024-11-12 RX ADMIN — CEFEPIME 2000 MG: 2 INJECTION, POWDER, FOR SOLUTION INTRAVENOUS at 00:14

## 2024-11-12 RX ADMIN — SODIUM CHLORIDE, PRESERVATIVE FREE 10 ML: 5 INJECTION INTRAVENOUS at 08:03

## 2024-11-12 RX ADMIN — SODIUM PHOSPHATE, MONOBASIC, MONOHYDRATE AND SODIUM PHOSPHATE, DIBASIC, ANHYDROUS 15 MMOL: 142; 276 INJECTION, SOLUTION INTRAVENOUS at 13:05

## 2024-11-12 RX ADMIN — HEPARIN SODIUM 5000 UNITS: 5000 INJECTION INTRAVENOUS; SUBCUTANEOUS at 15:21

## 2024-11-12 RX ADMIN — POTASSIUM CHLORIDE 10 MEQ: 10 INJECTION, SOLUTION INTRAVENOUS at 23:41

## 2024-11-12 RX ADMIN — DEXTROSE AND SODIUM CHLORIDE: 5; 450 INJECTION, SOLUTION INTRAVENOUS at 13:05

## 2024-11-12 RX ADMIN — METOPROLOL TARTRATE 2.5 MG: 1 INJECTION, SOLUTION INTRAVENOUS at 18:10

## 2024-11-12 RX ADMIN — CEFEPIME 2000 MG: 2 INJECTION, POWDER, FOR SOLUTION INTRAVENOUS at 23:54

## 2024-11-12 RX ADMIN — HEPARIN SODIUM 5000 UNITS: 5000 INJECTION INTRAVENOUS; SUBCUTANEOUS at 06:34

## 2024-11-12 RX ADMIN — HEPARIN SODIUM 5000 UNITS: 5000 INJECTION INTRAVENOUS; SUBCUTANEOUS at 20:12

## 2024-11-12 RX ADMIN — POTASSIUM CHLORIDE 10 MEQ: 10 INJECTION, SOLUTION INTRAVENOUS at 22:36

## 2024-11-12 RX ADMIN — CEFEPIME 2000 MG: 2 INJECTION, POWDER, FOR SOLUTION INTRAVENOUS at 17:11

## 2024-11-12 RX ADMIN — METOPROLOL TARTRATE 2.5 MG: 1 INJECTION, SOLUTION INTRAVENOUS at 22:43

## 2024-11-12 NOTE — PROGRESS NOTES
Ph: [phone], Fax: [phone]           [facility]               Reason for admission:                 He was admitted with distended abdomen.  He also has altered mental status and difficulty breathing.    Brief Summary :     Gaudencio Pat is being seen by nephrology for acute kidney injury.      Interval History and plan:   Patient seen at bedside with mother and brother.   Comfortable  /83  On 3 lpm oxygen which is stable.   Urine output 1175 ml  Cr stable 0.6  Na 146  Phos 1.8  Vit D 28.9  Bicarbonate better with bicarbonate infusion with dextrose and Beta hydroxybutyrate was elevated likely starvation ketosis  Patient is not eating much per family.                  Per RN cannot take oral medications, so started on Clonidine patch and follow BP.   D/C bicarbonate infusion and change to 0.45 saline with dextrose to give free water and also dextrose. Does not appear volume overloaded.   Continue antibiotics for sepsis with UTI/Pyelonephritis.  Monitor vancomycin level.  Replete lytes as needed. Ordered phos replacement.   Replete vit D once able to take oral medications  Follow urine output   Defer decision for nutrition/ Feeding tube to primary service.     D/W patient's family.                Assessment :     Acute kidney injury: He arrived with a creatinine of 2.5 with a BUN of 39.  Baseline creatinine is less than 1.    Risk factors for his acute kidney injury is sepsis with hypotension  He was on ARB in the face of hypotension can cause further perfusion deficit.  DICK appears to be from prerenal state but ATN is also possible.      Metabolic acidosis: He has anion gap of 18 with a bicarb of 15.  Lactic acid on arrival was 5.2.    proBNP was elevated greater than 4000.    UTI with sepsis: He has leukocytosis, indwelling Gonzalez catheter, hypotension and tachycardia and urinalysis revealed moderate LE with bacteria and white cells.  Urine is frankly purulent     Hypertension but

## 2024-11-12 NOTE — PLAN OF CARE
Problem: Chronic Conditions and Co-morbidities  Goal: Patient's chronic conditions and co-morbidity symptoms are monitored and maintained or improved  Outcome: Progressing     Problem: Discharge Planning  Goal: Discharge to home or other facility with appropriate resources  Outcome: Progressing     Problem: Safety - Adult  Goal: Free from fall injury  Outcome: Progressing     Problem: Confusion  Goal: Confusion, delirium, dementia, or psychosis is improved or at baseline  Description: INTERVENTIONS:  1. Assess for possible contributors to thought disturbance, including medications, impaired vision or hearing, underlying metabolic abnormalities, dehydration, psychiatric diagnoses, and notify attending LIP  2. [place] high risk fall precautions, as indicated  3. Provide frequent short contacts to provide reality reorientation, refocusing and direction  4. Decrease environmental stimuli, including noise as appropriate  5. Monitor and intervene to maintain adequate nutrition, hydration, elimination, sleep and activity  6. If unable to ensure safety without constant attention obtain sitter and review sitter guidelines with assigned personnel  7. Initiate Psychosocial CNS and Spiritual Care consult, as indicated  Outcome: Progressing     Problem: Skin/Tissue Integrity  Goal: Absence of new skin breakdown  Description: 1.  Monitor for areas of redness and/or skin breakdown  2.  Assess vascular access sites hourly  3.  Every 4-6 hours minimum:  Change oxygen saturation probe site  4.  Every 4-6 hours:  If on nasal continuous positive airway pressure, respiratory therapy assess nares and determine need for appliance change or resting period.  Outcome: Progressing     Problem: Pain  Goal: Verbalizes/displays adequate comfort level or baseline comfort level  Outcome: Progressing     Problem: Nutrition Deficit:  Goal: Optimize nutritional status  Outcome: Progressing

## 2024-11-12 NOTE — PROGRESS NOTES
V2.0    [facility] Progress Note      Name:  Gaudencio Pat /Age/Sex: 1960  (64 y.o. male)   MRN & CSN:  [id number] & [id number] Encounter Date/Time: 2024 2:05 PM EST   Location:  [facility] PCP: Celio Ortiz     Attending:Kenyon Kerr*       Hospital Day: 3    Assessment and Recommendations     64-year-old male presenting with pain and admitted with septic shock secondary to Proteus UTI.  Has Proteus bacteremia.  Chronically bedridden, nonverbal.  Has been having poor oral intake.  Has an DICK and high anion gap metabolic acidosis that improving.    Proteus bacteremia  Complicated UTI  Septic shock -resolved  -ID consult.  Repeat blood cultures.  Continue IV antibiotics.  -Probably need PICC line and 2 weeks of IV antibiotics at discharge    Poor oral intake  Altered mental status  -IV fluids as per nephrology.  SLP evaluation.  Would pursue brain imaging if continues to be persistently altered.  Might need PEG tube.    Uncontrolled HTN  -Restart clonidine.  As needed labetalol as ordered    DICK  High anion gap metabolic acidosis  -Improving.  Appreciate nephrology.    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  Expected Disposition: SNF  Estimated Date of Discharge: TBD  Patient requires continued admission due to Poor oral intake.     Surrogate Decision Maker/ POA  On file     Personally reviewed Lab Studies and Imaging     Discussed management of the case with case management.  I reviewed nephrology's notes        Subjective:     Patient seen and evaluated at bedside.  Nonverbal      Review of Systems:      Pertinent positives and negatives

## 2024-11-12 NOTE — PROGRESS NOTES
Pt's BP still elevated at 170 systolic after multiple rechecks. MD notified. PO Clonidine unheld however pt not taking oral meds at this time. PRN order for labetalol placed for SBP >170   Keystone Flap Text: The defect edges were debeveled with a #15 scalpel blade.  Given the location of the defect, shape of the defect a keystone flap was deemed most appropriate.  Using a sterile surgical marker, an appropriate keystone flap was drawn incorporating the defect, outlining the appropriate donor tissue and placing the expected incisions within the relaxed skin tension lines where possible. The area thus outlined was incised deep to adipose tissue with a #15 scalpel blade.  The skin margins were undermined to an appropriate distance in all directions around the primary defect and laterally outward around the flap utilizing iris scissors.

## 2024-11-12 NOTE — PROGRESS NOTES
Pt's BP elevated this am, MD notified. Pt also not tracking/following commands for this RN. Pt will not eat/drink/take morning medications either. MD notified.

## 2024-11-12 NOTE — CONSULTS
Infectious Diseases   Consult Note      Reason for Consult: proteus bacteremia    Requesting Physician: Dr. Grace     Date of Admission: 11/10/2024  Subjective:   CHIEF COMPLAINT: AMS     HPI:    64 year old male with Hx of right hemorrhagic stroke with residual hemiparesis, dysarthria, dysphagia and bilateral upper and lower extremities contractures, HTN, DM2, recent hospitalization in sep 2024 for bulbar urethral disruption, hematuria and constipation that presented on 11/10 with AMS from NH and distended abd. Upon presentation, pt with WBC of 14.8 and temp 100.6. he was started on vanco and cefepime. Cr was elevated acutely to 2.5. CT abd pelvis showed prominent bilateral symmetric perinephric fat stranding with very mild dilatation of the intrarenal collecting systems and ureters. UCX showing proteus and enterococcus faecalis. Blood cx showing proteus on BCID.                   Current abx: cefepime          Past Surgical History:       Diagnosis Date    Abnormal posture     Acute respiratory failure     Anemia     Aphasia     Dermatophytosis of nail     Diabetes mellitus (HCC)     Hemiplegia (HCC)     right sided    Hypertension     Ingrowing nail     Intracerebral hemorrhage (HCC)     Other specified disease of nail     Other specified disease of sebaceous glands     Unspecified cerebral artery occlusion with cerebral infarction     Unspecified nonsenile cataract          Procedure Laterality Date    CYSTOSCOPY N/A 9/12/2024    . performed by Kiko Bower MD at [facility] OR    SCROTAL SURGERY N/A 9/12/2024    PERINEAL EXPLORATION, CYSTOSCOPY, CASH PLACEMENT OVER WIRE, IRRIGATION AND DEBRIDMENT OF PERINEUM performed by Kiko Bower MD at [facility] OR       Social History:    TOBACCO:   reports that he has never smoked. He has never been exposed to tobacco smoke. He has never used smokeless tobacco.  ETOH:   reports that he does not currently use alcohol.  There is no history of illicit drug use or other  faecalis, sensi pending     Blood cx x 2 11/10: both sets positive for GNR, proteus on PCR     Radiology Review:  All pertinent images / reports were reviewed as a part of this visit.     CT abd and pelvis 11/10:  More prominent bilateral symmetric perinephric fat stranding with very mild dilatation of the intrarenal collecting systems and ureters. Correlate for potential pyelonephritis.  Subtle hyperdense material within the urinary bladder, potentially blood products versus less likely contrast material from recent contrast administration or calculi.  Large amount of stool in the rectum concerning for impaction without significant obstruction at this time.      CXR 11/10:  No acute process.    Assessment:     Patient Active Problem List   Diagnosis    Encephalopathy acute    Hematuria    Septic shock (HCC)     Plan:   64 year old male with Hx of right hemorrhagic stroke with residual hemiparesis, dysarthria, dysphagia and bilateral upper and lower extremities contractures, HTN, DM2, recent hospitalization in sep 2024 for bulbar urethral disruption, hematuria and constipation that presented on 11/10 with AMS from NH and distended abd.    Proteus bacteremia and bilateral pyelonephritis:  - bacteremia related to pyelonephritis  - pt was eval by Urology on presentation, reina exchanged in ED. No surgical intervention planned at this time.   - plan to discuss possible SPT placement as OP.   - Currently on cefepime, continue pending sensi results  - will repeat set of bcx, hold off on midline placement pending clearance of bacteremia  - continued low grade temps 99  - will plan 14 day course with pyelo and bacteremia, final abx selection pending cx results and clearance of bcx.     DICK:  - pt with presentation cr of 2.5, now down to 0.6  - nephrology following, renally dose adjust abx as needed, avoid nephrotoxics        Medical Decision Making:  The following items were considered in medical decision

## 2024-11-12 NOTE — CARE COORDINATION
CASE MANAGEMENT NOTE:     Patient is from [facility]. Patient continues with medical treatment in hospital. Plan is to have ID consulted for IV antibiotic treatment. Patient is planned to return back to [facility]. Per Soumya at [facility] no precert needed to return. Transportation at time of discharge is EMS. Discharge may be within the next 3 days. CM will continue following.     Electronically signed by Aly Dubose RN on 11/12/2024 at 10:44 AM

## 2024-11-12 NOTE — PROGRESS NOTES
Speech Language Pathology  Facility/Department:[facility]  Bedside Swallow Evaluation                                                       Name: Gaudencio Pat  : 1960  MRN: [id number]    Patient Diagnosis(es):   Patient Active Problem List    Diagnosis Date Noted    Septic shock (HCC) 11/10/2024    Hematuria 2024    Encephalopathy acute 2013       Past Medical History:   Diagnosis Date    Abnormal posture     Acute respiratory failure     Anemia     Aphasia     Dermatophytosis of nail     Diabetes mellitus (HCC)     Hemiplegia (HCC)     right sided    Hypertension     Ingrowing nail     Intracerebral hemorrhage (HCC)     Other specified disease of nail     Other specified disease of sebaceous glands     Unspecified cerebral artery occlusion with cerebral infarction     Unspecified nonsenile cataract      Past Surgical History:   Procedure Laterality Date    CYSTOSCOPY N/A 2024    . performed by Kiko Bower MD at [facility] OR    SCROTAL SURGERY N/A 2024    PERINEAL EXPLORATION, CYSTOSCOPY, CASH PLACEMENT OVER WIRE, IRRIGATION AND DEBRIDMENT OF PERINEUM performed by Kiko Bower MD at [facility] OR       Reason for Referral:  Gaudencio Pat  was referred for a Speech Therapy evaluation to assess swallow function.    History of Present Illness  Per admitting H&P: 11/10/2024  '64 y.o. male with pmh of right hemorrhagic stroke in  with left hemiparesis, dysarthria, dysphagia and bilateral upper and lower extremities chronic contractures, history of hypertension, diabetes mellitus not on insulin at this time who was recently hospitalized in September for concerns of foreign years gangrene but this was ruled out patient had bulbar urethral disruption gross hematuria and severe constipation at that time who presents with altered mental status from the nursing home with shortness of breath and distended abdomen.  Patient was found in the ER to meet criteria for septic shock  secondary to UTI having hematuria urinary retention and is being admitted at this time.'    CXR: 11/10/2024  No acute process.     CT head: none this admission    Date of onset: 11/10/2024    Current Diet:  ADULT DIET; Dysphagia - Soft and Bite Sized  ADULT ORAL NUTRITION SUPPLEMENT; Breakfast, Lunch, Dinner; Low Calorie/High Protein Oral Supplement    Treatment Diagnosis: dysphagia    Pain:  None indicated by any means    General:  Chart reviewed: Yes  Behavior/Cognition: more lethargic/less alert than normal per family  Communication Observation: non-verbal  Follows Directions: no (reportedly able to at baseline)  Dentition/Oral Mucosa: no dentures per family; unable to fully assess  Oral motor exam: pt not following directions  Vocal quality: unable to assess, no phonation  Respiratory Status/oxygen requirements: 3L O2 via NC  Vision/Hearing: unknown  Patient Complaint: unable to state  Patient Positioning: upright in bed  PLOF: from nursing facility    Prior Dysphagia History:   Remote MBS completed in 2013 showing no aspiration and recommending thin liquids + soft & bite-sized solids. Family reports this is still pt's diet level at baseline.    Bedside Swallowing Evaluation Impression 11/12  Severe oral and suspect pharyngeal dysphagia in setting of altered mentation. Bedside, assessed ice chips, thins via tsp/straw, puree; pt with limited oral acceptance by tsp (unable to create adequate/suction through straw), with anterior labial loss, no appreciable swallow movement. Recommend NPO, consider temporary alternative means nutrition/hydration. Will continue to follow.    Prognosis:  Prognosis for improvement: fair  Considerations: prior level of function, acute illness    Treatment:  Dysphagia Goals:  The pt will be seen  3-5 x to address the following goals:  Goal 1: Patient will participate in further assessment of swallow function as appropriate.  Goal 2: Patient/caregiver will demonstrate understanding of

## 2024-11-13 ENCOUNTER — APPOINTMENT (OUTPATIENT)
Dept: MRI IMAGING | Age: 64
DRG: 871 | End: 2024-11-13
Payer: MEDICARE

## 2024-11-13 PROBLEM — I63.512 ACUTE ISCHEMIC LEFT MCA STROKE (HCC): Status: ACTIVE | Noted: 2024-11-13

## 2024-11-13 LAB
ALBUMIN SERPL-MCNC: 3 G/DL (ref 3.4–5)
ALBUMIN SERPL-MCNC: 3.1 G/DL (ref 3.4–5)
ANION GAP SERPL CALCULATED.3IONS-SCNC: 11 MMOL/L (ref 3–16)
ANION GAP SERPL CALCULATED.3IONS-SCNC: 13 MMOL/L (ref 3–16)
BACTERIA BLD CULT ORG #2: ABNORMAL
BACTERIA BLD CULT: ABNORMAL
BACTERIA BLD CULT: ABNORMAL
BUN SERPL-MCNC: 14 MG/DL (ref 7–20)
BUN SERPL-MCNC: 14 MG/DL (ref 7–20)
CALCIUM SERPL-MCNC: 9.2 MG/DL (ref 8.3–10.6)
CALCIUM SERPL-MCNC: 9.2 MG/DL (ref 8.3–10.6)
CHLORIDE SERPL-SCNC: 101 MMOL/L (ref 99–110)
CHLORIDE SERPL-SCNC: 103 MMOL/L (ref 99–110)
CO2 SERPL-SCNC: 22 MMOL/L (ref 21–32)
CO2 SERPL-SCNC: 25 MMOL/L (ref 21–32)
CREAT SERPL-MCNC: 0.6 MG/DL (ref 0.8–1.3)
CREAT SERPL-MCNC: 0.7 MG/DL (ref 0.8–1.3)
DEPRECATED RDW RBC AUTO: 14.6 % (ref 12.4–15.4)
EKG DIAGNOSIS: NORMAL
EKG Q-T INTERVAL: 228 MS
EKG QRS DURATION: 82 MS
EKG QTC CALCULATION (BAZETT): 319 MS
EKG R AXIS: -2 DEGREES
EKG T AXIS: 251 DEGREES
EKG VENTRICULAR RATE: 118 BPM
GFR SERPLBLD CREATININE-BSD FMLA CKD-EPI: >90 ML/MIN/{1.73_M2}
GFR SERPLBLD CREATININE-BSD FMLA CKD-EPI: >90 ML/MIN/{1.73_M2}
GLUCOSE BLD-MCNC: 102 MG/DL (ref 70–99)
GLUCOSE BLD-MCNC: 128 MG/DL (ref 70–99)
GLUCOSE BLD-MCNC: 90 MG/DL (ref 70–99)
GLUCOSE BLD-MCNC: 98 MG/DL (ref 70–99)
GLUCOSE SERPL-MCNC: 87 MG/DL (ref 70–99)
GLUCOSE SERPL-MCNC: 91 MG/DL (ref 70–99)
HCT VFR BLD AUTO: 34.1 % (ref 40.5–52.5)
HGB BLD-MCNC: 11.3 G/DL (ref 13.5–17.5)
MCH RBC QN AUTO: 29.8 PG (ref 26–34)
MCHC RBC AUTO-ENTMCNC: 33.1 G/DL (ref 31–36)
MCV RBC AUTO: 90.1 FL (ref 80–100)
ORGANISM: ABNORMAL
PERFORMED ON: ABNORMAL
PERFORMED ON: ABNORMAL
PERFORMED ON: NORMAL
PERFORMED ON: NORMAL
PHOSPHATE SERPL-MCNC: 1.7 MG/DL (ref 2.5–4.9)
PHOSPHATE SERPL-MCNC: 1.9 MG/DL (ref 2.5–4.9)
PLATELET # BLD AUTO: 118 K/UL (ref 135–450)
PMV BLD AUTO: 8.9 FL (ref 5–10.5)
POTASSIUM SERPL-SCNC: 4 MMOL/L (ref 3.5–5.1)
POTASSIUM SERPL-SCNC: ABNORMAL MMOL/L (ref 3.5–5.1)
RBC # BLD AUTO: 3.79 M/UL (ref 4.2–5.9)
SODIUM SERPL-SCNC: 137 MMOL/L (ref 136–145)
SODIUM SERPL-SCNC: 138 MMOL/L (ref 136–145)
WBC # BLD AUTO: 7 K/UL (ref 4–11)

## 2024-11-13 PROCEDURE — 99221 1ST HOSP IP/OBS SF/LOW 40: CPT | Performed by: NURSE PRACTITIONER

## 2024-11-13 PROCEDURE — 2500000003 HC RX 250 WO HCPCS: Performed by: STUDENT IN AN ORGANIZED HEALTH CARE EDUCATION/TRAINING PROGRAM

## 2024-11-13 PROCEDURE — 80069 RENAL FUNCTION PANEL: CPT

## 2024-11-13 PROCEDURE — 36415 COLL VENOUS BLD VENIPUNCTURE: CPT

## 2024-11-13 PROCEDURE — 92526 ORAL FUNCTION THERAPY: CPT

## 2024-11-13 PROCEDURE — 99232 SBSQ HOSP IP/OBS MODERATE 35: CPT | Performed by: INTERNAL MEDICINE

## 2024-11-13 PROCEDURE — 6360000002 HC RX W HCPCS: Performed by: INTERNAL MEDICINE

## 2024-11-13 PROCEDURE — 6360000002 HC RX W HCPCS: Performed by: STUDENT IN AN ORGANIZED HEALTH CARE EDUCATION/TRAINING PROGRAM

## 2024-11-13 PROCEDURE — 76937 US GUIDE VASCULAR ACCESS: CPT

## 2024-11-13 PROCEDURE — 2580000003 HC RX 258: Performed by: INTERNAL MEDICINE

## 2024-11-13 PROCEDURE — 70551 MRI BRAIN STEM W/O DYE: CPT

## 2024-11-13 PROCEDURE — 85027 COMPLETE CBC AUTOMATED: CPT

## 2024-11-13 PROCEDURE — 93010 ELECTROCARDIOGRAM REPORT: CPT | Performed by: INTERNAL MEDICINE

## 2024-11-13 PROCEDURE — 2060000000 HC ICU INTERMEDIATE R&B

## 2024-11-13 RX ORDER — GLYCOPYRROLATE 0.2 MG/ML
0.2 INJECTION INTRAMUSCULAR; INTRAVENOUS EVERY 4 HOURS PRN
Status: DISCONTINUED | OUTPATIENT
Start: 2024-11-13 | End: 2024-11-14 | Stop reason: HOSPADM

## 2024-11-13 RX ORDER — LORAZEPAM 2 MG/ML
1 INJECTION INTRAMUSCULAR EVERY 6 HOURS PRN
Status: DISCONTINUED | OUTPATIENT
Start: 2024-11-13 | End: 2024-11-14 | Stop reason: HOSPADM

## 2024-11-13 RX ORDER — DEXTROSE, SODIUM CHLORIDE, SODIUM LACTATE, POTASSIUM CHLORIDE, AND CALCIUM CHLORIDE 5; .6; .31; .03; .02 G/100ML; G/100ML; G/100ML; G/100ML; G/100ML
INJECTION, SOLUTION INTRAVENOUS CONTINUOUS
Status: DISCONTINUED | OUTPATIENT
Start: 2024-11-13 | End: 2024-11-13

## 2024-11-13 RX ADMIN — POTASSIUM CHLORIDE 10 MEQ: 10 INJECTION, SOLUTION INTRAVENOUS at 05:37

## 2024-11-13 RX ADMIN — POTASSIUM CHLORIDE 10 MEQ: 10 INJECTION, SOLUTION INTRAVENOUS at 01:53

## 2024-11-13 RX ADMIN — PIPERACILLIN AND TAZOBACTAM 4500 MG: 4; .5 INJECTION, POWDER, LYOPHILIZED, FOR SOLUTION INTRAVENOUS; PARENTERAL at 12:18

## 2024-11-13 RX ADMIN — POTASSIUM CHLORIDE 10 MEQ: 10 INJECTION, SOLUTION INTRAVENOUS at 03:17

## 2024-11-13 RX ADMIN — POTASSIUM CHLORIDE 10 MEQ: 10 INJECTION, SOLUTION INTRAVENOUS at 00:49

## 2024-11-13 RX ADMIN — SODIUM CHLORIDE, PRESERVATIVE FREE 10 ML: 5 INJECTION INTRAVENOUS at 09:02

## 2024-11-13 RX ADMIN — CEFEPIME 2000 MG: 2 INJECTION, POWDER, FOR SOLUTION INTRAVENOUS at 09:01

## 2024-11-13 RX ADMIN — POTASSIUM CHLORIDE 10 MEQ: 10 INJECTION, SOLUTION INTRAVENOUS at 04:25

## 2024-11-13 RX ADMIN — METOPROLOL TARTRATE 2.5 MG: 1 INJECTION, SOLUTION INTRAVENOUS at 05:42

## 2024-11-13 RX ADMIN — HEPARIN SODIUM 5000 UNITS: 5000 INJECTION INTRAVENOUS; SUBCUTANEOUS at 05:41

## 2024-11-13 NOTE — PROGRESS NOTES
ID Follow-up NOTE    CC:   AMS  Antibiotics: Cefepime    Admit Date: 11/10/2024  Hospital Day: 4    Subjective:     Patient has not been eating or opening mouth to administer po meds. Low grade temps around 99.       Objective:     Patient Vitals for the past 8 hrs:   BP Temp Temp src Pulse Resp SpO2 Weight   11/13/24 0900 122/71 98.7 °F (37.1 °C) Axillary 60 16 100 % --   11/13/24 0532 -- -- -- -- -- -- 67.3 kg (148 lb 5.9 oz)   11/13/24 0454 (!) 150/81 98.8 °F (37.1 °C) Oral 63 18 100 % --     I/O last 3 completed shifts:  In: 50 [P.O.:50]  Out: 4275 [Urine:4275]  No intake/output data recorded.    EXAM:  GENERAL: No apparent distress.  awake, not conversive. Thin, ill appearing  HEENT: Membranes moist, no oral lesion  NECK:  Supple, no lymphadenopathy  LUNGS: Clear b/l, no rales, no dullness  CARDIAC: RRR, no murmur appreciated  ABD:  + BS, soft, no apparent SP or CVA tenderness, reina in place with clear yellow urine  EXT:  Bilateral upper and lower extremities contracted. Muscle wasting.   NEURO: not following commands, tracks examiner.   PSYCH: Unable to assess  LINES:  Peripheral iv       Data Review:  Lab Results   Component Value Date    WBC 11.8 (H) 11/12/2024    HGB 11.3 (L) 11/12/2024    HCT 35.0 (L) 11/12/2024    MCV 91.8 11/12/2024    PLT 96 (L) 11/12/2024     Lab Results   Component Value Date    CREATININE 0.5 (L) 11/12/2024    BUN 13 11/12/2024     11/12/2024    K 3.0 (L) 11/12/2024     11/12/2024    CO2 24 11/12/2024       Hepatic Function Panel:   Lab Results   Component Value Date/Time    ALKPHOS 110 11/12/2024 05:17 PM    ALT 17 11/12/2024 05:17 PM    AST 48 11/12/2024 05:17 PM    BILITOT 0.6 11/12/2024 05:17 PM    BILIDIR <0.1 11/10/2024 10:00 AM    IBILI 0.6 11/10/2024 10:00 AM       MICRO:  Blood culture 11/12: neg to date    Ucx 11/10: > 100k CFU proteus mirabilis and enterococus faecalis   Proteus mirabilis (1)    Antibiotic Interpretation Microscan  Method Status   mg IntraDERmal Once    metoprolol  2.5 mg IntraVENous Q6H    cefepime  2,000 mg IntraVENous Q8H    heparin (porcine)  5,000 Units SubCUTAneous 3 times per day    polyethylene glycol  17 g Oral BID    sodium chloride flush  5-40 mL IntraVENous 2 times per day    sodium chloride flush  5-40 mL IntraVENous 2 times per day    insulin lispro  0-4 Units SubCUTAneous 4x Daily AC & HS    [Held by provider] aspirin  81 mg Oral Daily    baclofen  5 mg Oral BID    [Held by provider] losartan  50 mg Oral Daily    atorvastatin  10 mg Oral Daily       Continuous Infusions:   sodium chloride      sodium chloride      sodium chloride         PRN Meds:  sodium chloride flush, sodium chloride, labetalol, sodium chloride flush, sodium chloride, ondansetron **OR** ondansetron, polyethylene glycol, acetaminophen **OR** acetaminophen, sodium chloride flush, sodium chloride, [Held by provider] bisacodyl      Assessment:     Patient Active Problem List   Diagnosis    Encephalopathy acute    Hematuria    Septic shock (HCC)          Plan:   64 year old male with Hx of right hemorrhagic stroke with residual hemiparesis, dysarthria, dysphagia and bilateral upper and lower extremities contractures, HTN, DM2, recent hospitalization in sep 2024 for bulbar urethral disruption, hematuria and constipation that presented on 11/10 with AMS from NH and distended abd.     Proteus bacteremia and bilateral pyelonephritis:  - bacteremia related to pyelonephritis  - pt was eval by Urology on presentation, reina exchanged in ED. No surgical intervention planned at this time.   - plan to discuss possible SPT placement as OP.   - Currently on cefepime, with isolation of amp sensitive enterococcus in Ucx, will switch to pip-taazo 11/13  - pending repeat set of bcx 11/12, hold off on midline placement pending clearance of bacteremia  - continued low grade temps around 99  - will plan 14 day course with pyelo and bacteremia, see RE. Presume repeated bcx form

## 2024-11-13 NOTE — PROGRESS NOTES
nondilated.  No suspicious bowel wall thickening or surrounding inflammatory fat stranding. LYMPH NODES: No abnormally enlarged nodes. PERITONEUM/RETROPERITONEUM: No ascites or free air. VESSELS: Atherosclerotic calcification of the abdominal aorta without aneurysmal dilatation. ABDOMINAL WALL: Interval resolution of emphysema in the peritoneum and scrotum since prior study from September 2024. BONES: No destructive process.     More prominent bilateral symmetric perinephric fat stranding with very mild dilatation of the intrarenal collecting systems and ureters. Correlate for potential pyelonephritis. Subtle hyperdense material within the urinary bladder, potentially blood products versus less likely contrast material from recent contrast administration or calculi. Large amount of stool in the rectum concerning for impaction without significant obstruction at this time.  Electronically signed by Perry Najera MD    XR CHEST PORTABLE    Result Date: 11/10/2024  PORTABLE AP CHEST X-RAY: REASON FOR EXAM: altered mental status COMPARISON: May 18, 2013 FINDINGS: Portable AP view of the chest demonstrates a normal cardiomediastinal silhouette. Patient hands obscure the lung bases partially. Calcific density in the right midlung may be in the chest wall or large calcified granuloma. No acute appearing airspace disease.Normal pulmonary vascularity. No pleural effusion or acute bony abnormality.     No acute process. Electronically signed by Perry Najera MD      CBC:   Recent Labs     11/11/24  1049 11/12/24  0657   WBC 14.4* 11.8*   HGB 11.4* 11.3*   PLT 99* 96*     BMP:    Recent Labs     11/11/24  0915 11/12/24  0657 11/12/24  1717    146* 143   K 4.1 3.6 3.0*   * 112* 104   CO2 15* 23 24   BUN 33* 23* 13   CREATININE 1.0 0.6* 0.5*   GLUCOSE 94 99 114*     Hepatic:   Recent Labs     11/12/24  1717   AST 48*   ALT 17   BILITOT 0.6   ALKPHOS 110     Lipids:   Lab Results   Component Value Date/Time    CHOL  235 05/19/2013 06:30 AM    HDL 44 05/19/2013 06:30 AM    TRIG 103 05/19/2013 06:30 AM     Hemoglobin A1C:   Lab Results   Component Value Date/Time    LABA1C 5.1 05/19/2013 06:30 AM     TSH: No results found for: \"TSH\"  Troponin: No results found for: \"TROPONINT\"  Lactic Acid:   Recent Labs     11/12/24  1717   LACTA 0.7     BNP:   No results for input(s): \"PROBNP\" in the last 72 hours.    UA:  Lab Results   Component Value Date/Time    NITRU Negative 11/10/2024 10:00 AM    COLORU Yellow 11/10/2024 10:00 AM    PHUR 8.5 11/10/2024 10:00 AM    PHUR 6.0 05/18/2013 11:59 PM    WBCUA 6-9 11/10/2024 10:00 AM    RBCUA 5-10 11/10/2024 10:00 AM    BACTERIA 4+ 11/10/2024 10:00 AM    CLARITYU Clear 11/10/2024 10:00 AM    LEUKOCYTESUR MODERATE 11/10/2024 10:00 AM    UROBILINOGEN 0.2 11/10/2024 10:00 AM    BILIRUBINUR Negative 11/10/2024 10:00 AM    BLOODU LARGE 11/10/2024 10:00 AM    GLUCOSEU Negative 11/10/2024 10:00 AM    KETUA Negative 11/10/2024 10:00 AM     Urine Cultures:   Lab Results   Component Value Date/Time    LABURIN >100,000 CFU/ml 11/10/2024 09:53 AM    LABURIN >100,000 CFU/ml 11/10/2024 09:53 AM     Blood Cultures:   Lab Results   Component Value Date/Time    BC  11/12/2024 11:11 AM     No Growth to date.  Any change in status will be called.     Lab Results   Component Value Date/Time    BLOODCULT2  11/10/2024 09:53 AM     POSITIVE for  No further workup  Isolated two of two sets  Refer to culture [id number] for sensitivity results       Organism:   Lab Results   Component Value Date/Time    ORG Proteus species DNA Detected 11/10/2024 09:53 AM    ORG Proteus mirabilis 11/10/2024 09:53 AM    ORG Proteus mirabilis 11/10/2024 09:53 AM    ORG Proteus mirabilis 11/10/2024 09:53 AM    ORG Enterococcus faecalis 11/10/2024 09:53 AM         Electronically signed by Kenyon Haney MD on 11/13/2024 at 12:42 PM

## 2024-11-13 NOTE — PLAN OF CARE
Problem: Chronic Conditions and Co-morbidities  Goal: Patient's chronic conditions and co-morbidity symptoms are monitored and maintained or improved  Outcome: Progressing  Flowsheets (Taken 11/11/2024 0944 by Bertha Wise, RN)  Care Plan - Patient's Chronic Conditions and Co-Morbidity Symptoms are Monitored and Maintained or Improved:   Monitor and assess patient's chronic conditions and comorbid symptoms for stability, deterioration, or improvement   Collaborate with multidisciplinary team to address chronic and comorbid conditions and prevent exacerbation or deterioration   Update acute care plan with appropriate goals if chronic or comorbid symptoms are exacerbated and prevent overall improvement and discharge     Problem: Discharge Planning  Goal: Discharge to home or other facility with appropriate resources  Outcome: Progressing  Flowsheets (Taken 11/11/2024 0104 by Joe Rodriguez RN)  Discharge to home or other facility with appropriate resources:   Identify barriers to discharge with patient and caregiver   Arrange for needed discharge resources and transportation as appropriate     Problem: Safety - Adult  Goal: Free from fall injury  Outcome: Progressing  Flowsheets (Taken 11/11/2024 0944 by Bertha Wise, RN)  Free From Fall Injury: Instruct family/caregiver on patient safety     Problem: Confusion  Goal: Confusion, delirium, dementia, or psychosis is improved or at baseline  Description: INTERVENTIONS:  1. Assess for possible contributors to thought disturbance, including medications, impaired vision or hearing, underlying metabolic abnormalities, dehydration, psychiatric diagnoses, and notify attending LIP  2. [place] high risk fall precautions, as indicated  3. Provide frequent short contacts to provide reality reorientation, refocusing and direction  4. Decrease environmental stimuli, including noise as appropriate  5. Monitor and intervene to maintain adequate nutrition,  hydration, elimination, sleep and activity  6. If unable to ensure safety without constant attention obtain sitter and review sitter guidelines with assigned personnel  7. Initiate Psychosocial CNS and Spiritual Care consult, as indicated  Outcome: Progressing  Flowsheets (Taken 11/11/2024 0944 by Bertha Wise, RN)  Effect of thought disturbance (confusion, delirium, dementia, or psychosis) are managed with adequate functional status:   Provide frequent short contacts to provide reality reorientation, refocusing and direction   If unable to ensure safety without constant attention obtain sitter and review sitter guidelines with assigned personnel     Problem: Skin/Tissue Integrity  Goal: Absence of new skin breakdown  Description: 1.  Monitor for areas of redness and/or skin breakdown  2.  Assess vascular access sites hourly  3.  Every 4-6 hours minimum:  Change oxygen saturation probe site  4.  Every 4-6 hours:  If on nasal continuous positive airway pressure, respiratory therapy assess nares and determine need for appliance change or resting period.  Outcome: Progressing     Problem: Nutrition Deficit:  Goal: Optimize nutritional status  Outcome: Progressing  Flowsheets (Taken 11/13/2024 3574)  Nutrient intake appropriate for improving, restoring, or maintaining nutritional needs:   Assess nutritional status and recommend course of action   Monitor oral intake, labs, and treatment plans   Recommend appropriate diets, oral nutritional supplements, and vitamin/mineral supplements   Order, calculate, and assess calorie counts as needed   Provide specific nutrition education to patient or family as appropriate

## 2024-11-13 NOTE — PLAN OF CARE
Problem: Chronic Conditions and Co-morbidities  Goal: Patient's chronic conditions and co-morbidity symptoms are monitored and maintained or improved  11/13/2024 1445 by John Woody, RN  Outcome: Progressing  Flowsheets (Taken 11/13/2024 1445)  Care Plan - Patient's Chronic Conditions and Co-Morbidity Symptoms are Monitored and Maintained or Improved:   Update acute care plan with appropriate goals if chronic or comorbid symptoms are exacerbated and prevent overall improvement and discharge   Monitor and assess patient's chronic conditions and comorbid symptoms for stability, deterioration, or improvement   Collaborate with multidisciplinary team to address chronic and comorbid conditions and prevent exacerbation or deterioration     Problem: Discharge Planning  Goal: Discharge to home or other facility with appropriate resources  11/13/2024 1445 by John Woody, RN  Outcome: Progressing  Flowsheets (Taken 11/13/2024 1445)  Discharge to home or other facility with appropriate resources:   Identify barriers to discharge with patient and caregiver   Arrange for needed discharge resources and transportation as appropriate   Identify discharge learning needs (meds, wound care, etc)     Problem: Safety - Adult  Goal: Free from fall injury  11/13/2024 1445 by John Woody, RN  Outcome: Progressing  Flowsheets (Taken 11/13/2024 1445)  Free From Fall Injury:   Based on caregiver fall risk screen, instruct family/caregiver to ask for assistance with transferring infant if caregiver noted to have fall risk factors   Instruct family/caregiver on patient safety     Problem: Confusion  Goal: Confusion, delirium, dementia, or psychosis is improved or at baseline  Description: INTERVENTIONS:  1. Assess for possible contributors to thought disturbance, including medications, impaired vision or hearing, underlying metabolic abnormalities, dehydration, psychiatric diagnoses, and notify attending LIP  2. [place] high risk

## 2024-11-13 NOTE — DISCHARGE INSTR - COC
Continuity of Care Form    Patient Name: Gaudencio Pat   :  1960  MRN:  [id number]    Admit date:  11/10/2024  Discharge date:  ***    Code Status Order: Full Code   Advance Directives:   Advance Care Flowsheet Documentation             Admitting Physician:  Kyle Carmen MD  PCP: Celio Ortiz    Discharging Nurse: ***  Discharging Hospital Unit/Room#: 4319/4319-01  Discharging Unit Phone Number: ***    Emergency Contact:   Extended Emergency Contact Information  Primary Emergency Contact: Avelina Colbert  Home Phone: [phone]  Relation: Parent  Secondary Emergency Contact: Octaviano Pat  Home Phone: [phone]  Relation: Brother/Sister    Past Surgical History:  Past Surgical History:   Procedure Laterality Date    CYSTOSCOPY N/A 2024    . performed by Kiko Bower MD at [facility] OR    SCROTAL SURGERY N/A 2024    PERINEAL EXPLORATION, CYSTOSCOPY, CASH PLACEMENT OVER WIRE, IRRIGATION AND DEBRIDMENT OF PERINEUM performed by Kiko Bower MD at [facility] OR       Immunization History:     There is no immunization history on file for this patient.    Active Problems:  Patient Active Problem List   Diagnosis Code    Encephalopathy acute G93.40    Hematuria R31.9    Septic shock (HCC) A41.9, R65.21       Isolation/Infection:   Isolation            Contact          Patient Infection Status       Infection Onset Added Last Indicated Last Indicated By Review Planned Expiration Resolved Resolved By    MDRO (multi-drug resistant organism)  24 Jorge Hu, RN                Nurse Assessment:  Last Vital Signs: /71   Pulse 60   Temp 98.7 °F (37.1 °C) (Axillary)   Resp 16   Ht 1.626 m (5' 4\")   Wt 67.3 kg (148 lb 5.9 oz)   SpO2 100%   BMI 25.47 kg/m²     Last documented pain score (0-10 scale):    Last Weight:   Wt Readings from Last 1 Encounters:   24 67.3 kg (148 lb 5.9 oz)     Mental Status:  {IP PT MENTAL STATUS:}  NON RESPONSIVE/ AND NON VERBAL  IV

## 2024-11-13 NOTE — PROGRESS NOTES
Ph: [phone], Fax: [phone]           [facility]               Reason for admission:                 He was admitted with distended abdomen.  He also has altered mental status and difficulty breathing.    Brief Summary :     Gaudencio Pat is being seen by nephrology for acute kidney injury.      Interval History and plan:   Patient seen at bedside with mother   Comfortable  BP better 122/71  On 1 lpm oxygen which is stable.   Urine output 3350  ml  Labs not drawn yet  Oral intake is not good per mother.              Follow renal panel and plan accordingly.   Continue  Clonidine patch and follow BP.   On antibiotics for sepsis with UTI/Pyelonephritis.  Replete vit D once able to take oral medications  Follow urine output   Might need feeding tube for nutrition and hydration as oral intake is not great.       D/W patient's mother at bedside.       Addendum  Labs pending due to difficult draw  Ordered RL with dextrose.                Assessment :     Acute kidney injury: He arrived with a creatinine of 2.5 with a BUN of 39.  Baseline creatinine is less than 1.    Risk factors for his acute kidney injury is sepsis with hypotension  He was on ARB in the face of hypotension can cause further perfusion deficit.  DICK appears to be from prerenal state but ATN is also possible.      Metabolic acidosis: He has anion gap of 18 with a bicarb of 15.  Lactic acid on arrival was 5.2.    proBNP was elevated greater than 4000.    UTI with sepsis: He has leukocytosis, indwelling Gonzalez catheter, hypotension and tachycardia and urinalysis revealed moderate LE with bacteria and white cells.  Urine is frankly purulent     Hypertension but currently hypotensive: Will discontinue amlodipine, losartan and clonidine.    [facility] would like to thank Kenyon Kerr*   for opportunity to serve this patient      Please call with questions at-   24 Hrs Answering service [phone] or  7 am- 5  no  Abdomen:  soft  Other relevant findings: contracted posture      Labs :     CBC:   Recent Labs     11/11/24  1049 11/12/24  0657   WBC 14.4* 11.8*   HGB 11.4* 11.3*   HCT 35.7* 35.0*   PLT 99* 96*     BMP:    Recent Labs     11/11/24  0915 11/12/24  0657 11/12/24  1717    146* 143   K 4.1 3.6 3.0*   * 112* 104   CO2 15* 23 24   BUN 33* 23* 13   CREATININE 1.0 0.6* 0.5*   GLUCOSE 94 99 114*   MG  --   --  1.84   PHOS 2.2* 1.8*  --      Lab Results   Component Value Date/Time    COLORU Yellow 11/10/2024 10:00 AM    NITRU Negative 11/10/2024 10:00 AM    GLUCOSEU Negative 11/10/2024 10:00 AM    KETUA Negative 11/10/2024 10:00 AM    UROBILINOGEN 0.2 11/10/2024 10:00 AM    BILIRUBINUR Negative 11/10/2024 10:00 AM        ----------------------------------------------------------  Please call with questions at      24 Hrs Answering service [phone]  Perfect serve, or cell phone 7 am - 5pm  Briana Earl MD  [facility].[employer]

## 2024-11-13 NOTE — PROGRESS NOTES
Evaluated pt for midline/access.  Pt very contacted and staff can't pull arms away from body to assess inside of arm.  Pt also without cephalic veins.  Dr Feldman and Dr Grace notified of pt will not be able to have a specialty line in either arms.  VAT was able to place a left foot 22g 1.75\" catheter for access and obtained renal panel.  If pt needs further access, he would need MD inserted CVC.  Did attempt 1 USG PIV right upper shoulder without success.

## 2024-11-13 NOTE — PROGRESS NOTES
Physical Therapy/Occupational Therapy  Discharge note    Referral received, chart reviewed. Per CM notes, pt is from LTC and will return at discharge without a precert. Will sign off for acute PT and OT and defer any therapy needs back to LTC.       Janki Farris, PT   Laura Tanner, OTR/L [phone]

## 2024-11-13 NOTE — PROGRESS NOTES
Speech Language Pathology  Facility/Department:[facility] 4 PCU  Dysphagia Therapy Note                                                       Name: Gaudencio Pat  : 1960  MRN: [id number]    Patient Diagnosis(es):   Patient Active Problem List    Diagnosis Date Noted    Septic shock (HCC) 11/10/2024    Hematuria 2024    Encephalopathy acute 2013       Past Medical History:   Diagnosis Date    Abnormal posture     Acute respiratory failure     Anemia     Aphasia     Dermatophytosis of nail     Diabetes mellitus (HCC)     Hemiplegia (HCC)     right sided    Hypertension     Ingrowing nail     Intracerebral hemorrhage (HCC)     Other specified disease of nail     Other specified disease of sebaceous glands     Unspecified cerebral artery occlusion with cerebral infarction     Unspecified nonsenile cataract      Past Surgical History:   Procedure Laterality Date    CYSTOSCOPY N/A 2024    . performed by Kiko Bower MD at [facility] OR    SCROTAL SURGERY N/A 2024    PERINEAL EXPLORATION, CYSTOSCOPY, CASH PLACEMENT OVER WIRE, IRRIGATION AND DEBRIDMENT OF PERINEUM performed by Kiko Bower MD at [facility] OR     History of Present Illness  Per admitting H&P: 11/10/2024  '64 y.o. male with pmh of right hemorrhagic stroke in  with left hemiparesis, dysarthria, dysphagia and bilateral upper and lower extremities chronic contractures, history of hypertension, diabetes mellitus not on insulin at this time who was recently hospitalized in September for concerns of foreign years gangrene but this was ruled out patient had bulbar urethral disruption gross hematuria and severe constipation at that time who presents with altered mental status from the nursing home with shortness of breath and distended abdomen.  Patient was found in the ER to meet criteria for septic shock secondary to UTI having hematuria urinary retention and is being admitted at this time.'    CXR: 11/10/2024  No acute  Will continue to follow.  Cont goal    Education  Provided education to patient re: role of SLP, purpose of visit, recommendations. Patient with doubtful comprehension, however family verbalized good understanding.    Pt goal: did not state  Pt discharge goal: did not state    Total treatment time: 10' dysphagia tx    Plan:  Continue per POC  Recommended diet:   NPO + temporary alternative means nutrition/hydration  Oral hygiene via suction kit 2-3 x daily    Discharge Recommendation: TBD closer to discharge  Discussed with RNJohn  Needs met prior to leaving room, call light within reach    Judy Nixon, SLP, [id number]  Pg. # [phone]    This document will serve as a discharge summary if patient discharges prior to next visit.

## 2024-11-13 NOTE — CONSULTS
[facility]  Palliative Medicine Consultation Note      Date Of Admission:11/10/2024  Date of consult: 11/13/24  Seen by PC in the past:  No    Recommendations:        Met with the pt's mother and brother Landry in the conference room. Introduced palliative care and had a voluntary discussion about advance care planning. The pt's mother said she has been pt's HCPOA for many years, since his stroke 17 years ago. She has noticed him deteriorating recently, and is accepting that he is not going to recover from this new stroke. We discussed the option of comfort care and hospice. Explained the hospice philosophy and services, and offered choice of hospice. They prefer that he not return to [facility] and prefer that he go to a hospice inpatient unit. Pt's mother prefers [facility] inpatient unit which is near her home in [place]. Discussed code status and she is in favor of DNR at this point. D/w CM Aly and RAVI Dooley.    1. Goals of Care/Advanced Care planning/Code status: changed to DNR-CC per family's wishes. Pt's family would like to meet with hospice.  2. Pain: pt does not appear to be in any acute distress  3. AMS: p/w AMS and was admitted with septic shock secondary to urinary tract infection. With a lack of improvement in his mental status, he underwent brain MRI today showing a large acute ischemic MCA stroke in the left frontal, temporal, parietal, and occipital lobes. He is now unresponsive.  4. Disposition: d/c to [facility] inpatient unit when able.    Reason for Consult:         [x]  Goals of Care  []  Code Status Discussion/Advanced Care Planning   []  Psychosocial/Family Support  []  Symptom Management  []  Other (Specify)    Requesting Physician: Dr. Grace    CHIEF COMPLAINT:  AMS     History Obtained From:  mother, electronic medical record    History of Present Illness:         Gaudencio Pat is a 64 y.o. male with PMH of right  records: 10 min   Time communicating with staff: 5 min     A total of 40 minutes spent with the patient and family on unit greater than 50% in counseling regarding palliative care and in goals of care for the patient.    Thank you to Dr. Grace for this consultation. We will continue to follow Mr. Pat's care as needed.    Althea Theodore NP  Palliative Care  [phone]

## 2024-11-13 NOTE — PLAN OF CARE
Consulted for evaluation of massive L MCA stroke.    Briefly, 65 yo gentleman baseline bedridden, nonverbal and contracted from old CVA in 2007 presented acute AMS. Diagnosed with UTI but AMS worsened. MRI today shows large acute ischemic stroke in L MCA territory spanning L frontal, temporal, parietal and occipital lobes.    Physical exam  Opens eyes to physical stimuli. L eye gaze deviation. Does not cross midline. Does not attend examiner or attempt to communicate in any way.  Upper extremities severely contracted. Minimal response to painful stimuli in BUE/BLE    Long discussion with family regarding goals of care and quality of life. Showed family MRI findings and explained deficits. Mother and brothers made decision to pursue comfort care. Palliative care consulted. Patient made DORENE-LYRIC.     MRI brain:   IMPRESSION:  Large acute ischemic insult in the left MCA territory spanning the left frontal, temporal, parietal, and occipital lobes. No associated hemorrhage or mass effect.      Plan above discussed with Dr. Landon who is in agreement.       Maik Crow  RN, MSN, ACNP-BC  Neurology, Neurocritical Care  PerfectServe: [facility] Neurocritical Care    Time spent: 55 min

## 2024-11-13 NOTE — PROGRESS NOTES
MRI brain revealed large left MCA stroke.  Neurology neurocritical care team consulted.  Palliative care team consulted.  Patient's CODE STATUS updated to DNR CC.  Hospice to be consulted.

## 2024-11-14 VITALS
HEART RATE: 114 BPM | TEMPERATURE: 97.9 F | OXYGEN SATURATION: 98 % | DIASTOLIC BLOOD PRESSURE: 78 MMHG | RESPIRATION RATE: 18 BRPM | WEIGHT: 148.37 LBS | HEIGHT: 64 IN | SYSTOLIC BLOOD PRESSURE: 111 MMHG | BODY MASS INDEX: 25.33 KG/M2

## 2024-11-14 PROCEDURE — 99232 SBSQ HOSP IP/OBS MODERATE 35: CPT | Performed by: INTERNAL MEDICINE

## 2024-11-14 NOTE — PROGRESS NOTES
Reviewed chart.   Patient has large CVA and now in hospice care  Patient not seen.   Will sign off  Please call us in case of any questions     Briana Earl MD

## 2024-11-14 NOTE — CARE COORDINATION
Case Management Assessment            Discharge Note                    Date / Time of Note: 11/14/2024 12:10 PM                  Discharge Note Completed by: Aly Dubose RN    Patient Name: Gaudencio Pat   YOB: 1960  Diagnosis: Septicemia (HCC) [A41.9]  Acute UTI [N39.0]  Acute urinary retention [R33.8]  Septic shock (HCC) [A41.9, R65.21]  Acute renal failure, unspecified acute renal failure type (HCC) [N17.9]   Date / Time: 11/10/2024  9:18 AM    Current PCP: Celio Ortiz patient: Yes    Hospitalization in the last 30 days: No       Advance Directives:  Code Status: DNR-CC  [place] DNR form completed and on chart: Yes    Financial:  Payor: [facility] MEDICARE / Plan: [facility] MEDICARE COMPLETE / Product Type: *No Product type* /      Pharmacy:    [id number] [place], [place] - [address] - P [phone] - F [phone]  [facility] [address]  [facility] [address]  Phone: [phone] Fax: [phone]      Assistance purchasing medications?: Potential Assistance Purchasing Medications: No  Assistance provided by Case Management: None at this time    Does patient want to participate in local refill/ meds to beds program?:      Meds To Beds General Rules:  1. Can ONLY be done Monday- Friday between 8:30am-5pm  2. Prescription(s) must be in pharmacy by 3pm to be filled same day  3.Copy of patient's insurance/ prescription drug card and patient face sheet must be sent along with the prescription(s)  4. Cost of Rx cannot be added to hospital bill. If financial assistance is needed, please contact unit  or ;  or  CANNOT provide pharmacy voucher for patients co-pays  5. Patients can then  the prescription on their way out of the hospital at discharge, or pharmacy can deliver to the bedside if staff is available. (payment due at time of pick-up or delivery - cash, check, or card accepted)     Able to afford home

## 2024-11-14 NOTE — PROGRESS NOTES
Assessment unchanged in comparison to yesterday. Pt still unable to follow commands/track on neuro assessment. Pt minimally withdraws to painful stimuli. Pt not able to eat/drink/take medications. /71 this AM. MD notified at bedside.

## 2024-11-14 NOTE — PROGRESS NOTES
Palliative Care Chart Review  and Check in Note:     NAME:  Gaudencio Pat  Admit Date: 11/10/2024  Hospital Day:  Hospital Day: 5   Current Code status: DNR-CC    Palliative care is continuing to following Mr. Pat for symptom management,  and goals of care discussion as needed. Patient's chart reviewed today 11/14/24.      D/w KAMILLE RN       The following are the currently established goals/code status, and Symptom management.     Goals of care: goals are comfort directed    Code status: DNRCC    Discharge plan: d/c to [facility] inpatient unit when able.       Luann Sow, KINJAL - CNP  11/14/24  9:24 AM

## 2024-11-14 NOTE — PROGRESS NOTES
Met with patient's mom, Avelina, to discuss hospice philosophy, services, and level of care.  Discussed hospice care at the [facility] as hospice referral showed as plan.  Provided education on the short term nature for purpose of short term management of symptoms and plan for LTC following inpatient unit.  Provided information regarding hospice services in the LTC setting as well.  Avelina stated that she was not aware that inpatient unit was short term, but that she would like to go there as her son has been gritting his teeth and tensing his limbs which she feels is a sign of pain or agitation.  Transport is arranged with [facility] at 1530.  Please leave IV and reina in place.  Please call with any questions.    Billie Gaspar RN  Hospital Liaison   [phone]

## 2024-11-14 NOTE — DISCHARGE INSTR - DIET

## 2024-11-14 NOTE — DISCHARGE SUMMARY
V2.0  Discharge Summary    Name:  Gaudencio Pat /Age/Sex: 1960 (64 y.o. male)   Admit Date: 11/10/2024  Discharge Date: 24    MRN & CSN:  [id number] & [id number] Encounter Date and Time 24 12:35 PM EST    Attending:  Kenyon Kerr* Discharging Provider: Kenyon Haney MD       Hospital Course:     64-year-old male presenting with pain and admitted with septic shock secondary to Proteus UTI. Has Proteus bacteremia. Chronically bedridden, nonverbal. Has been having poor oral intake. Has an DICK and high anion gap metabolic acidosis.  Brain imaging showing very large left MCA stroke.  No role for neurosurgical intervention.  On 2024 patient was discharged with hospice services.    Proteus bacteremia  Complicated UTI  Septic shock  Poor oral intake  Altered mental status  Atrial fibrillation  Large MCA stroke    The patient expressed appropriate understanding of, and agreement with the discharge recommendations, medications, and plan.     Consults this admission:  IP CONSULT TO NEPHROLOGY  IP CONSULT TO UROLOGY  IP CONSULT TO INFECTIOUS DISEASES  IP CONSULT TO DIETITIAN  IP CONSULT TO PALLIATIVE CARE  IP CONSULT TO HOSPICE    Discharge Diagnosis:   Septic shock (HCC)        Discharge Instruction:     Disposition: Discharged to:   []Home, []C, []SNF, []Acute Rehab, [x]Hospice       Discharge Medications:        Medication List        STOP taking these medications      aluminum & magnesium hydroxide-simethicone 400-400-40 MG/5ML Susp  Commonly known as: MYLANTA     amLODIPine 5 MG tablet  Commonly known as: NORVASC     aspirin 81 MG EC tablet     baclofen 10 MG tablet  Commonly known as: LIORESAL     bisacodyl 5 MG EC tablet  Commonly known as: DULCOLAX     cloNIDine 0.1 MG/24HR  Commonly known as: CATAPRES     ergocalciferol 8000 UNIT/ML drops  Commonly known as: DRISDOL     glucagon (rDNA) 1 MG Solr injection     losartan 100 MG tablet  Commonly known as: COZAAR

## 2024-11-14 NOTE — PROGRESS NOTES
ID Follow-up NOTE    CC:   AMS  Antibiotics: Cefepime    Admit Date: 11/10/2024  Hospital Day: 5    Subjective:     Patient remains afebrile, unresponsive, MRI yesterday showing large left MCA stroke. Family has elected to pursue hospice.     Objective:     Patient Vitals for the past 8 hrs:   BP Temp Temp src Pulse Resp SpO2   11/14/24 0845 -- -- -- (!) 126 -- --   11/14/24 0820 115/71 98.5 °F (36.9 °C) Axillary (!) 158 16 96 %   11/14/24 0400 125/88 97 °F (36.1 °C) Axillary (!) 129 -- 98 %     I/O last 3 completed shifts:  In: 0   Out: 3300 [Urine:3300]  No intake/output data recorded.    EXAM:  GENERAL: No apparent distress.  Awake but not conversive. Thin, ill appearing  HEENT: Membranes moist, no oral lesion  NECK:  Supple, no lymphadenopathy  LUNGS: Clear b/l, no rales, no dullness  CARDIAC: RRR, no murmur appreciated  ABD:  + BS, no apparent SP or CVA tenderness, reina in place with clear yellow urine  EXT:  Bilateral upper and lower extremities contracted. Muscle wasting.   NEURO: not following commands  PSYCH: Unable to assess  LINES:  Peripheral iv       Data Review:  Lab Results   Component Value Date    WBC 7.0 11/13/2024    HGB 11.3 (L) 11/13/2024    HCT 34.1 (L) 11/13/2024    MCV 90.1 11/13/2024     (L) 11/13/2024     Lab Results   Component Value Date    CREATININE 0.7 (L) 11/13/2024    BUN 14 11/13/2024     11/13/2024    K see below 11/13/2024     11/13/2024    CO2 25 11/13/2024       Hepatic Function Panel:   Lab Results   Component Value Date/Time    ALKPHOS 110 11/12/2024 05:17 PM    ALT 17 11/12/2024 05:17 PM    AST 48 11/12/2024 05:17 PM    BILITOT 0.6 11/12/2024 05:17 PM    BILIDIR <0.1 11/10/2024 10:00 AM    IBILI 0.6 11/10/2024 10:00 AM       MICRO:  Blood culture 11/12: neg to date    Ucx 11/10: > 100k CFU proteus mirabilis and enterococus faecalis   Proteus mirabilis (1)    Antibiotic Interpretation Microscan  Method Status    amoxicillin-clavulanate Resistant >16/8  process.        Scheduled Meds:        Continuous Infusions:        PRN Meds:  glycopyrrolate, LORazepam      Assessment:     Patient Active Problem List   Diagnosis    Encephalopathy acute    Hematuria    Septic shock (HCC)    Acute ischemic left MCA stroke (HCC)          Plan:   64 year old male with Hx of right hemorrhagic stroke with residual hemiparesis, dysarthria, dysphagia and bilateral upper and lower extremities contractures, HTN, DM2, recent hospitalization in sep 2024 for bulbar urethral disruption, hematuria and constipation that presented on 11/10 with AMS from NH and distended abd.     Proteus bacteremia and bilateral pyelonephritis:  - bacteremia related to pyelonephritis  - pt was eval by Urology on presentation, reina exchanged in ED. No surgical intervention planned at this time.   - had planned to discuss possible SPT placement as OP, now to go on hospice with new large left MCA stroke.   - Currently on cefepime, with isolation of amp sensitive enterococcus in Ucx, switched to pip-taazo 11/13  - repeat set of bcx 11/12, remains neg to date.   - pt with new L MCA stroke, planned to go on hospice. Can continue IV abx while inpatient until switched to hospice. No further plans for OP abx or midline placement. D/w PICC team nurse.      DICK:  - pt with presentation cr of 2.5, now down to 0.7  - nephrology following    Large left MCA stroke:  - MRI performed on 11/13 showing large left MCA stroke, eval by neurology and d/w family, planning to pursue hospice on d/c.       Medical Decision Making:  The following items were considered in medical decision making:  Discussion of patient care with other providers  Reviewed clinical lab tests  Reviewed radiology tests  Reviewed other diagnostic tests/interventions  Independent review of radiologic images  Microbiology cultures and other micro tests reviewed      Discussed with pt, SW and primary team, Dr. Grace. Thank you for this consult. We will sign

## 2024-11-14 NOTE — PROGRESS NOTES
Speech-Language Pathology    Reviewed chart, spoke with RN. Patient family is pursuing hospice at this time; will sign off.     Judy Nixon, SLP, [id number]  Ph. # [phone]

## 2024-11-14 NOTE — CARE COORDINATION
10:07 AM  Message sent to Soumya CARNES RN with [facility].     Electronically signed by Eddie Vega RN, CM on 11/14/2024 at 10:08 AM.  Phone: [phone]  Fax: [phone]      8:04 AM  Notified by [facility] of patient and family preference of [facility] for hospice services. Referral sent via [facility] fax.     Electronically signed by Eddie Vega RN, CM on 11/14/2024 at 8:04 AM.  Phone: [phone]  Fax: [phone]

## 2024-11-14 NOTE — PLAN OF CARE
Problem: Chronic Conditions and Co-morbidities  Goal: Patient's chronic conditions and co-morbidity symptoms are monitored and maintained or improved  Outcome: Progressing  Flowsheets (Taken 11/13/2024 1445 by John Woody, RN)  Care Plan - Patient's Chronic Conditions and Co-Morbidity Symptoms are Monitored and Maintained or Improved:   Update acute care plan with appropriate goals if chronic or comorbid symptoms are exacerbated and prevent overall improvement and discharge   Monitor and assess patient's chronic conditions and comorbid symptoms for stability, deterioration, or improvement   Collaborate with multidisciplinary team to address chronic and comorbid conditions and prevent exacerbation or deterioration     Problem: Discharge Planning  Goal: Discharge to home or other facility with appropriate resources  Outcome: Progressing  Flowsheets (Taken 11/13/2024 1445 by John Woody, RN)  Discharge to home or other facility with appropriate resources:   Identify barriers to discharge with patient and caregiver   Arrange for needed discharge resources and transportation as appropriate   Identify discharge learning needs (meds, wound care, etc)     Problem: Safety - Adult  Goal: Free from fall injury  Outcome: Progressing  Flowsheets (Taken 11/13/2024 1445 by John Woody, RN)  Free From Fall Injury:   Based on caregiver fall risk screen, instruct family/caregiver to ask for assistance with transferring infant if caregiver noted to have fall risk factors   Instruct family/caregiver on patient safety     Problem: Skin/Tissue Integrity  Goal: Absence of new skin breakdown  Description: 1.  Monitor for areas of redness and/or skin breakdown  2.  Assess vascular access sites hourly  3.  Every 4-6 hours minimum:  Change oxygen saturation probe site  4.  Every 4-6 hours:  If on nasal continuous positive airway pressure, respiratory therapy assess nares and determine need for appliance change or resting

## 2024-11-16 LAB — BACTERIA BLD CULT: NORMAL

## 2024-11-17 NOTE — PROGRESS NOTES
Physician Progress Note      PATIENT:               TR WILKINSON  [facility] #:                  [id number]  :                       1960  ADMIT DATE:       11/10/2024 9:18 AM  DISCH DATE:        2024 4:00 PM  RESPONDING  PROVIDER #:        Kenyon Haney MD          QUERY TEXT:    Patient admitted with Sepsis d/t UTI. Per WOCN, noted to also have Stage 3   decubitus ulcer (MD notes ). If possible, please document in progress   notes and discharge summary the location, present on admission status and   stage of the pressure ulcer:    The medical record reflects the following:  Risk Factors: diabetes, chronic pressure, decreased mobility, and shear force  Clinical Indicators: WOCN notes  \"Pressure Stage 3 Left buttock\"  Treatment: [facility]OCN consult, per recommendation \"-Dressing/Treatment: [facility]   hydro/zinc oxide-based hydrophilic paste\"    Stage 1:  Non-blanchable erythema of intact skin  Stage 2:  Abrasion, Blister, Partial-thickness skin loss, with exposed dermis  Stage 3:  Full-thickness skin loss with damage or necrosis of subcutaneous   tissue  Stage 4:  Full-thickness skin & soft tissue loss through to underlying muscle,   tendon or bone  Unstageable: Obscured full-thickness skin & tissue loss  Options provided:  -- Stage 3 Pressure Ulcer of Left buttock present on admission  -- Other - I will add my own diagnosis  -- Disagree - Not applicable / Not valid  -- Disagree - Clinically unable to determine / Unknown  -- Refer to Clinical Documentation Reviewer    PROVIDER RESPONSE TEXT:    This patient has a Stage 3 pressure ulcer of the left buttock which was   present on admission.    Query created by: Maru Lawson on 2024 8:32 PM      Electronically signed by:  Kenyon Haney MD 2024 5:53 PM

## (undated) DEVICE — SUTURE MONOCRYL SZ 3-0 L27IN ABSRB UD L26MM SH 1/2 CIR Y416H

## (undated) DEVICE — TRAP FLUID

## (undated) DEVICE — GLOVE ORANGE PI 7   MSG9070

## (undated) DEVICE — GAUZE FLUFF 1 PLY: Brand: DEROYAL

## (undated) DEVICE — GLOVE ORANGE PI 7 1/2   MSG9075

## (undated) DEVICE — NEPTUNE E-SEP SMOKE EVACUATION PENCIL, COATED, 70MM BLADE, PUSH BUTTON SWITCH: Brand: NEPTUNE E-SEP

## (undated) DEVICE — NEEDLE SPNL 20GA L3.5IN YEL HUB S STL REG WALL FIT STYL

## (undated) DEVICE — CYSTO: Brand: MEDLINE INDUSTRIES, INC.

## (undated) DEVICE — GENERAL: Brand: MEDLINE INDUSTRIES, INC.

## (undated) DEVICE — HOOK RETRACT STERIL 12MM S STL BLNT E STAY LONE STAR

## (undated) DEVICE — RESERVOIR,SUCTION,100CC,SILICONE: Brand: MEDLINE

## (undated) DEVICE — RETRACTOR SURG W18.3XL32.5CM PLAS SELF RET W/ 2 CATH CLP

## (undated) DEVICE — CATHETER URETH 16FR BLLN 5CC STD LTX 2 W F TWO OPP DRNGE

## (undated) DEVICE — BLADE,CARBON-STEEL,11,STRL,DISPOSABLE,TB: Brand: MEDLINE

## (undated) DEVICE — 60 ML SYRINGE,CATHETER TIP: Brand: MONOJECT

## (undated) DEVICE — PREMIUM WET SKIN PREP TRAY: Brand: MEDLINE INDUSTRIES, INC.

## (undated) DEVICE — CLEANER,CAUTERY TIP,2X2",STERILE: Brand: MEDLINE

## (undated) DEVICE — UNDERPANTS INCONT XL 45-70IN KNIT SEAMLESS DSGN COLOR-CODED

## (undated) DEVICE — 1LYRTR 16FR10ML100%SIL UMS SNP: Brand: MEDLINE INDUSTRIES, INC.

## (undated) DEVICE — SOLUTION IV 1000ML 0.9% SOD CHL

## (undated) DEVICE — ELECTRODE NDL L2.8IN COAT LO PWR SET EDGE

## (undated) DEVICE — SHEET, T, LAPAROTOMY, STERILE: Brand: MEDLINE

## (undated) DEVICE — SUTURE PERMA-HAND SZ 2-0 L30IN NONABSORBABLE BLK L30MM FSL 679H

## (undated) DEVICE — BAG DRAINAGE 2000ML UROLOGY ANTI REFLUX CHAMBER SAMPLE PORT

## (undated) DEVICE — TOWEL,STOP FLAG GOLD N-W: Brand: MEDLINE

## (undated) DEVICE — DRAIN SURG 10FR 100% SIL RND END PERF W/ TRCR

## (undated) DEVICE — SUTURE VICRYL + SZ 3-0 L27IN ABSRB UD L26MM SH 1/2 CIR VCP416H

## (undated) DEVICE — PENROSE DRAIN 12" X 1/2": Brand: CARDINAL HEALTH

## (undated) DEVICE — GUIDEWIRE ENDOSCP L150CM DIA0.035IN TIP 3CM PTFE NIT